# Patient Record
Sex: FEMALE | Race: WHITE | NOT HISPANIC OR LATINO | Employment: PART TIME | URBAN - METROPOLITAN AREA
[De-identification: names, ages, dates, MRNs, and addresses within clinical notes are randomized per-mention and may not be internally consistent; named-entity substitution may affect disease eponyms.]

---

## 2017-05-03 ENCOUNTER — TELEPHONE (OUTPATIENT)
Dept: INTERNAL MEDICINE | Facility: CLINIC | Age: 67
End: 2017-05-03

## 2017-05-05 ENCOUNTER — TRANSCRIBE ORDERS (OUTPATIENT)
Dept: INTERNAL MEDICINE | Facility: CLINIC | Age: 67
End: 2017-05-05

## 2017-05-05 DIAGNOSIS — Z12.31 VISIT FOR SCREENING MAMMOGRAM: Primary | ICD-10-CM

## 2017-05-09 ENCOUNTER — OFFICE VISIT (OUTPATIENT)
Dept: INTERNAL MEDICINE | Facility: CLINIC | Age: 67
End: 2017-05-09

## 2017-05-09 VITALS
DIASTOLIC BLOOD PRESSURE: 76 MMHG | HEART RATE: 67 BPM | BODY MASS INDEX: 28.55 KG/M2 | HEIGHT: 61 IN | OXYGEN SATURATION: 98 % | SYSTOLIC BLOOD PRESSURE: 124 MMHG | WEIGHT: 151.2 LBS

## 2017-05-09 DIAGNOSIS — Z00.00 ANNUAL PHYSICAL EXAM: Primary | ICD-10-CM

## 2017-05-09 LAB
BILIRUB BLD-MCNC: NEGATIVE MG/DL
CLARITY, POC: CLEAR
COLOR UR: ABNORMAL
GLUCOSE UR STRIP-MCNC: NEGATIVE MG/DL
KETONES UR QL: NEGATIVE
LEUKOCYTE EST, POC: NEGATIVE
NITRITE UR-MCNC: NEGATIVE MG/ML
PH UR: 7 [PH] (ref 5–8)
PROT UR STRIP-MCNC: NEGATIVE MG/DL
RBC # UR STRIP: NEGATIVE /UL
SP GR UR: 1 (ref 1–1.03)
UROBILINOGEN UR QL: NORMAL

## 2017-05-09 PROCEDURE — 99397 PER PM REEVAL EST PAT 65+ YR: CPT | Performed by: INTERNAL MEDICINE

## 2017-05-09 PROCEDURE — 90471 IMMUNIZATION ADMIN: CPT | Performed by: INTERNAL MEDICINE

## 2017-05-09 PROCEDURE — 81003 URINALYSIS AUTO W/O SCOPE: CPT | Performed by: INTERNAL MEDICINE

## 2017-05-09 PROCEDURE — 90670 PCV13 VACCINE IM: CPT | Performed by: INTERNAL MEDICINE

## 2017-05-09 PROCEDURE — 90715 TDAP VACCINE 7 YRS/> IM: CPT | Performed by: INTERNAL MEDICINE

## 2017-05-09 PROCEDURE — 90472 IMMUNIZATION ADMIN EACH ADD: CPT | Performed by: INTERNAL MEDICINE

## 2017-05-09 RX ORDER — NAPROXEN SODIUM 220 MG
1 TABLET ORAL 2 TIMES DAILY
COMMUNITY

## 2017-05-12 ENCOUNTER — APPOINTMENT (OUTPATIENT)
Dept: MAMMOGRAPHY | Facility: HOSPITAL | Age: 67
End: 2017-05-12
Attending: INTERNAL MEDICINE

## 2017-05-18 ENCOUNTER — HOSPITAL ENCOUNTER (OUTPATIENT)
Dept: MAMMOGRAPHY | Facility: HOSPITAL | Age: 67
Discharge: HOME | End: 2017-05-18
Attending: INTERNAL MEDICINE | Admitting: INTERNAL MEDICINE

## 2017-05-18 DIAGNOSIS — Z12.31 VISIT FOR SCREENING MAMMOGRAM: ICD-10-CM

## 2017-05-18 PROCEDURE — G0202 SCR MAMMO BI INCL CAD: HCPCS | Performed by: RADIOLOGY

## 2017-05-18 PROCEDURE — 77063 BREAST TOMOSYNTHESIS BI: CPT

## 2017-05-18 PROCEDURE — 77063 BREAST TOMOSYNTHESIS BI: CPT | Performed by: RADIOLOGY

## 2017-05-18 PROCEDURE — G0202 SCR MAMMO BI INCL CAD: HCPCS

## 2018-04-24 ENCOUNTER — TRANSCRIBE ORDERS (OUTPATIENT)
Dept: INTERNAL MEDICINE | Facility: CLINIC | Age: 68
End: 2018-04-24

## 2018-04-24 DIAGNOSIS — Z12.31 VISIT FOR SCREENING MAMMOGRAM: Primary | ICD-10-CM

## 2018-05-25 ENCOUNTER — HOSPITAL ENCOUNTER (OUTPATIENT)
Dept: MAMMOGRAPHY | Facility: HOSPITAL | Age: 68
Discharge: HOME | End: 2018-05-25
Attending: INTERNAL MEDICINE | Admitting: INTERNAL MEDICINE

## 2018-05-25 DIAGNOSIS — Z12.31 VISIT FOR SCREENING MAMMOGRAM: ICD-10-CM

## 2018-05-25 PROCEDURE — 77067 SCR MAMMO BI INCL CAD: CPT

## 2018-05-25 PROCEDURE — 77063 BREAST TOMOSYNTHESIS BI: CPT

## 2018-05-25 PROCEDURE — 77067 SCR MAMMO BI INCL CAD: CPT | Performed by: RADIOLOGY

## 2018-05-25 PROCEDURE — 77063 BREAST TOMOSYNTHESIS BI: CPT | Performed by: RADIOLOGY

## 2018-10-26 ENCOUNTER — OFFICE VISIT (OUTPATIENT)
Dept: INTERNAL MEDICINE | Facility: CLINIC | Age: 68
End: 2018-10-26

## 2018-10-26 VITALS
OXYGEN SATURATION: 95 % | BODY MASS INDEX: 29.04 KG/M2 | HEIGHT: 61 IN | HEART RATE: 64 BPM | WEIGHT: 153.8 LBS | SYSTOLIC BLOOD PRESSURE: 130 MMHG | DIASTOLIC BLOOD PRESSURE: 82 MMHG

## 2018-10-26 DIAGNOSIS — Z11.59 SCREENING FOR VIRAL DISEASE: ICD-10-CM

## 2018-10-26 DIAGNOSIS — E78.5 HYPERLIPIDEMIA, UNSPECIFIED HYPERLIPIDEMIA TYPE: ICD-10-CM

## 2018-10-26 DIAGNOSIS — Z00.00 MEDICARE ANNUAL WELLNESS VISIT, SUBSEQUENT: Primary | ICD-10-CM

## 2018-10-26 DIAGNOSIS — E55.9 VITAMIN D DEFICIENCY: ICD-10-CM

## 2018-10-26 DIAGNOSIS — Z23 ENCOUNTER FOR IMMUNIZATION: ICD-10-CM

## 2018-10-26 DIAGNOSIS — L60.0 INGROWN NAIL OF GREAT TOE OF LEFT FOOT: ICD-10-CM

## 2018-10-26 LAB
25(OH)D3 SERPL-MCNC: 37.2 NG/ML
ALBUMIN SERPL-MCNC: 4.42 G/DL (ref 3.2–4.8)
ALBUMIN/GLOB SERPL: 2.4 G/DL (ref 1.5–2.5)
ALP SERPL-CCNC: 59 U/L (ref 25–100)
ALT SERPL W P-5'-P-CCNC: 18 U/L (ref 7–40)
ANION GAP SERPL CALCULATED.3IONS-SCNC: 6 MMOL/L (ref 3–11)
ARTICHOKE IGE QN: 93 MG/DL (ref 0–130)
AST SERPL-CCNC: 23 U/L (ref 0–33)
BILIRUB SERPL-MCNC: 0.3 MG/DL (ref 0.3–1.2)
BUN BLD-MCNC: 12 MG/DL (ref 9–23)
BUN/CREAT SERPL: 15.4 (ref 7–25)
CALCIUM SPEC-SCNC: 9.3 MG/DL (ref 8.7–10.4)
CHLORIDE SERPL-SCNC: 104 MMOL/L (ref 99–109)
CHOLEST SERPL-MCNC: 162 MG/DL (ref 0–200)
CO2 SERPL-SCNC: 29 MMOL/L (ref 20–31)
CREAT BLD-MCNC: 0.78 MG/DL (ref 0.6–1.3)
DEPRECATED RDW RBC AUTO: 43.4 FL (ref 37–54)
ERYTHROCYTE [DISTWIDTH] IN BLOOD BY AUTOMATED COUNT: 13.1 % (ref 11.3–14.5)
GFR SERPL CREATININE-BSD FRML MDRD: 74 ML/MIN/1.73
GLOBULIN UR ELPH-MCNC: 1.9 GM/DL
GLUCOSE BLD-MCNC: 91 MG/DL (ref 70–100)
HCT VFR BLD AUTO: 41.3 % (ref 34.5–44)
HCV AB SER DONR QL: NORMAL
HDLC SERPL-MCNC: 61 MG/DL (ref 40–60)
HGB BLD-MCNC: 13.4 G/DL (ref 11.5–15.5)
MCH RBC QN AUTO: 29.5 PG (ref 27–31)
MCHC RBC AUTO-ENTMCNC: 32.4 G/DL (ref 32–36)
MCV RBC AUTO: 91 FL (ref 80–99)
PLATELET # BLD AUTO: 202 10*3/MM3 (ref 150–450)
PMV BLD AUTO: 12.4 FL (ref 6–12)
POTASSIUM BLD-SCNC: 4.1 MMOL/L (ref 3.5–5.5)
PROT SERPL-MCNC: 6.3 G/DL (ref 5.7–8.2)
RBC # BLD AUTO: 4.54 10*6/MM3 (ref 3.89–5.14)
SODIUM BLD-SCNC: 139 MMOL/L (ref 132–146)
TRIGL SERPL-MCNC: 88 MG/DL (ref 0–150)
TSH SERPL DL<=0.05 MIU/L-ACNC: 2.58 MIU/ML (ref 0.35–5.35)
WBC NRBC COR # BLD: 6.79 10*3/MM3 (ref 3.5–10.8)

## 2018-10-26 PROCEDURE — 80061 LIPID PANEL: CPT | Performed by: INTERNAL MEDICINE

## 2018-10-26 PROCEDURE — 82306 VITAMIN D 25 HYDROXY: CPT | Performed by: INTERNAL MEDICINE

## 2018-10-26 PROCEDURE — G0439 PPPS, SUBSEQ VISIT: HCPCS | Performed by: INTERNAL MEDICINE

## 2018-10-26 PROCEDURE — 86803 HEPATITIS C AB TEST: CPT | Performed by: INTERNAL MEDICINE

## 2018-10-26 PROCEDURE — G0009 ADMIN PNEUMOCOCCAL VACCINE: HCPCS | Performed by: INTERNAL MEDICINE

## 2018-10-26 PROCEDURE — 84443 ASSAY THYROID STIM HORMONE: CPT | Performed by: INTERNAL MEDICINE

## 2018-10-26 PROCEDURE — 85027 COMPLETE CBC AUTOMATED: CPT | Performed by: INTERNAL MEDICINE

## 2018-10-26 PROCEDURE — 80053 COMPREHEN METABOLIC PANEL: CPT | Performed by: INTERNAL MEDICINE

## 2018-10-26 PROCEDURE — 90732 PPSV23 VACC 2 YRS+ SUBQ/IM: CPT | Performed by: INTERNAL MEDICINE

## 2018-10-26 NOTE — PROGRESS NOTES
QUICK REFERENCE INFORMATION:  The ABCs of the Annual Wellness Visit    Subsequent Medicare Wellness Visit    HEALTH RISK ASSESSMENT    1950    Recent Hospitalizations:  No hospitalization(s) within the last year..        Current Medical Providers:  Patient Care Team:  Rachele Paulino MD as PCP - General  Rachele Paulino MD as PCP - Family Medicine        Smoking Status:  History   Smoking Status   • Never Smoker   Smokeless Tobacco   • Never Used       Alcohol Consumption:  History   Alcohol Use   • Yes     Comment: 5-6 glasses of winea year       Depression Screen:   PHQ-2/PHQ-9 Depression Screening 10/26/2018   Little interest or pleasure in doing things 0   Feeling down, depressed, or hopeless 0   Total Score 0       Health Habits and Functional and Cognitive Screening:  Functional & Cognitive Status 10/26/2018   Do you have difficulty preparing food and eating? No   Do you have difficulty bathing yourself, getting dressed or grooming yourself? No   Do you have difficulty using the toilet? No   Do you have difficulty moving around from place to place? No   Do you have trouble with steps or getting out of a bed or a chair? No   In the past year have you fallen or experienced a near fall? Yes   Current Diet Well Balanced Diet   Dental Exam Up to date   Eye Exam Up to date   Exercise (times per week) 3 times per week   Current Exercise Activities Include Walking   Do you need help using the phone?  No   Are you deaf or do you have serious difficulty hearing?  No   Do you need help with transportation? No   Do you need help shopping? No   Do you need help preparing meals?  No   Do you need help with housework?  No   Do you need help with laundry? No   Do you need help taking your medications? No   Do you need help managing money? No   Do you ever drive or ride in a car without wearing a seat belt? No   Have you felt unusual stress, anger or loneliness in the last month? No   Who do you live with? Spouse   If  you need help, do you have trouble finding someone available to you? No   Have you been bothered in the last four weeks by sexual problems? No   Do you have difficulty concentrating, remembering or making decisions? No           Does the patient have evidence of cognitive impairment? No    Aspirin use counseling: Does not need ASA (and currently is not on it)      Recent Lab Results:  CMP:  Lab Results   Component Value Date    BUN 12 10/26/2018    CREATININE 0.78 10/26/2018    EGFRIFNONA 74 10/26/2018    BCR 15.4 10/26/2018     10/26/2018    K 4.1 10/26/2018    CO2 29.0 10/26/2018    CALCIUM 9.3 10/26/2018    ALBUMIN 4.42 10/26/2018    BILITOT 0.3 10/26/2018    ALKPHOS 59 10/26/2018    AST 23 10/26/2018    ALT 18 10/26/2018     Lipid Panel:  Lab Results   Component Value Date    CHOL 162 10/26/2018    TRIG 88 10/26/2018    HDL 61 (H) 10/26/2018     HbA1c:       Visual Acuity:  No exam data present    Age-appropriate Screening Schedule:  Refer to the list below for future screening recommendations based on patient's age, sex and/or medical conditions. Orders for these recommended tests are listed in the plan section. The patient has been provided with a written plan.    Health Maintenance   Topic Date Due   • ZOSTER VACCINE (2 of 2) 07/14/2017   • INFLUENZA VACCINE  11/07/2018 (Originally 8/1/2018)   • DXA SCAN  05/11/2019   • COLONOSCOPY  08/01/2019   • LIPID PANEL  10/26/2019   • MAMMOGRAM  05/25/2020   • TDAP/TD VACCINES (2 - Td) 05/09/2027   • PNEUMOCOCCAL VACCINES (65+ LOW/MEDIUM RISK)  Completed        Subjective   History of Present Illness    Kathleen Goetz is a 68 y.o. female who presents for an Subsequent Wellness Visit.    The following portions of the patient's history were reviewed and updated as appropriate: allergies, current medications, past family history, past medical history, past social history, past surgical history and problem list.    Outpatient Medications Prior to Visit   Medication  "Sig Dispense Refill   • Calcium Carbonate (CALTRATE 600) 1500 (600 CA) MG tablet Take 1 tablet by mouth daily.     • Misc Natural Products (OSTEO BI-FLEX TRIPLE STRENGTH) tablet Take 1 tablet by mouth daily.     • Multiple Vitamin (DAILY VITAMIN) tablet Take 1 tablet by mouth daily.     • naproxen sodium (ALEVE) 220 MG tablet Take 2 tablets by mouth 2 (Two) Times a Day.     • Turmeric 450 MG capsule Take 1 capsule by mouth Daily.       No facility-administered medications prior to visit.        Patient Active Problem List   Diagnosis   • Arthritis   • Thumb pain   • Annual physical exam       Advance Care Planning:  has an advance directive - a copy HAS NOT been provided. Have asked the patient to send this to us to add to record.    Identification of Risk Factors:  Risk factors include: weight  and unhealthy diet.    Review of Systems    Compared to one year ago, the patient feels her physical health is better.  Compared to one year ago, the patient feels her mental health is better.    Objective     Physical Exam    Vitals:    10/26/18 1108   BP: 130/82   Pulse: 64   SpO2: 95%  Comment: ra   Weight: 69.8 kg (153 lb 12.8 oz)   Height: 154.9 cm (61\")       Patient's Body mass index is 29.06 kg/m². BMI is above normal parameters. Recommendations include: exercise counseling and nutrition counseling.      Assessment/Plan   Patient Self-Management and Personalized Health Advice  The patient has been provided with information about: exercise, prevention of cardiac or vascular disease, fall prevention and supplements and preventive services including:   · Bone densitometry screening, Counseling for cardiovascular disease risk reduction, Diabetes screening, see lab orders, Pneumococcal vaccine , Zostavax vaccine (Herpes Zoster).    Visit Diagnoses:    ICD-10-CM ICD-9-CM   1. Medicare annual wellness visit, subsequent Z00.00 V70.0   2. Hyperlipidemia, unspecified hyperlipidemia type E78.5 272.4   3. Screening for viral " disease Z11.59 V73.99   4. Vitamin D deficiency E55.9 268.9   5. Ingrown nail of great toe of left foot L60.0 703.0   6. Encounter for immunization  Z23 V03.89       Orders Placed This Encounter   Procedures   • Pneumococcal Polysaccharide Vaccine 23-Valent (PPSV23) Greater Than or Equal To 1yo Subcutaneous / IM   • CBC (No Diff)   • Comprehensive Metabolic Panel   • Lipid Panel   • TSH   • Vitamin D 25 Hydroxy   • Hepatitis C Antibody       Outpatient Encounter Prescriptions as of 10/26/2018   Medication Sig Dispense Refill   • Calcium Carbonate (CALTRATE 600) 1500 (600 CA) MG tablet Take 1 tablet by mouth daily.     • Misc Natural Products (OSTEO BI-FLEX TRIPLE STRENGTH) tablet Take 1 tablet by mouth daily.     • Multiple Vitamin (DAILY VITAMIN) tablet Take 1 tablet by mouth daily.     • naproxen sodium (ALEVE) 220 MG tablet Take 2 tablets by mouth 2 (Two) Times a Day.     • Turmeric 450 MG capsule Take 1 capsule by mouth Daily.       No facility-administered encounter medications on file as of 10/26/2018.        Reviewed use of high risk medication in the elderly: yes  Reviewed for potential of harmful drug interactions in the elderly: yes    Medicare wellness    Subjective   Kathleen Goetz is a 68 y.o. female is here today for follow-up.    HPI  Here for a f/u, recovering from a cold, denies cough, fever.    Current Outpatient Prescriptions:   •  Calcium Carbonate (CALTRATE 600) 1500 (600 CA) MG tablet, Take 1 tablet by mouth daily., Disp: , Rfl:   •  Misc Natural Products (OSTEO BI-FLEX TRIPLE STRENGTH) tablet, Take 1 tablet by mouth daily., Disp: , Rfl:   •  Multiple Vitamin (DAILY VITAMIN) tablet, Take 1 tablet by mouth daily., Disp: , Rfl:   •  naproxen sodium (ALEVE) 220 MG tablet, Take 2 tablets by mouth 2 (Two) Times a Day., Disp: , Rfl:   •  Turmeric 450 MG capsule, Take 1 capsule by mouth Daily., Disp: , Rfl:       The following portions of the patient's history were reviewed and updated as  "appropriate: allergies, current medications, past family history, past medical history, past social history, past surgical history and problem list.        Objective   /82   Pulse 64   Ht 154.9 cm (61\")   Wt 69.8 kg (153 lb 12.8 oz)   SpO2 95% Comment: ra  BMI 29.06 kg/m²         Results for orders placed or performed in visit on 10/26/18   CBC (No Diff)   Result Value Ref Range    WBC 6.79 3.50 - 10.80 10*3/mm3    RBC 4.54 3.89 - 5.14 10*6/mm3    Hemoglobin 13.4 11.5 - 15.5 g/dL    Hematocrit 41.3 34.5 - 44.0 %    MCV 91.0 80.0 - 99.0 fL    MCH 29.5 27.0 - 31.0 pg    MCHC 32.4 32.0 - 36.0 g/dL    RDW 13.1 11.3 - 14.5 %    RDW-SD 43.4 37.0 - 54.0 fl    MPV 12.4 (H) 6.0 - 12.0 fL    Platelets 202 150 - 450 10*3/mm3   Comprehensive Metabolic Panel   Result Value Ref Range    Glucose 91 70 - 100 mg/dL    BUN 12 9 - 23 mg/dL    Creatinine 0.78 0.60 - 1.30 mg/dL    Sodium 139 132 - 146 mmol/L    Potassium 4.1 3.5 - 5.5 mmol/L    Chloride 104 99 - 109 mmol/L    CO2 29.0 20.0 - 31.0 mmol/L    Calcium 9.3 8.7 - 10.4 mg/dL    Total Protein 6.3 5.7 - 8.2 g/dL    Albumin 4.42 3.20 - 4.80 g/dL    ALT (SGPT) 18 7 - 40 U/L    AST (SGOT) 23 0 - 33 U/L    Alkaline Phosphatase 59 25 - 100 U/L    Total Bilirubin 0.3 0.3 - 1.2 mg/dL    eGFR Non African Amer 74 >60 mL/min/1.73    Globulin 1.9 gm/dL    A/G Ratio 2.4 1.5 - 2.5 g/dL    BUN/Creatinine Ratio 15.4 7.0 - 25.0    Anion Gap 6.0 3.0 - 11.0 mmol/L   Lipid Panel   Result Value Ref Range    Total Cholesterol 162 0 - 200 mg/dL    Triglycerides 88 0 - 150 mg/dL    HDL Cholesterol 61 (H) 40 - 60 mg/dL    LDL Cholesterol  93 0 - 130 mg/dL   TSH   Result Value Ref Range    TSH 2.575 0.350 - 5.350 mIU/mL   Vitamin D 25 Hydroxy   Result Value Ref Range    25 Hydroxy, Vitamin D 37.2 ng/ml   Hepatitis C Antibody   Result Value Ref Range    Hepatitis C Ab Non-Reactive Non-Reactive             Assessment/Plan   Diagnoses and all orders for this visit:    Medicare annual wellness " visit, subsequent  -     Pneumococcal Polysaccharide Vaccine 23-Valent (PPSV23) Greater Than or Equal To 1yo Subcutaneous / IM    Hyperlipidemia, unspecified hyperlipidemia type  -     Comprehensive Metabolic Panel  -     Lipid Panel  -     TSH    Screening for viral disease  -     Hepatitis C Antibody    Vitamin D deficiency  -     Vitamin D 25 Hydroxy    Ingrown nail of great toe of left foot  -     CBC (No Diff)    Encounter for immunization   -     Pneumococcal Polysaccharide Vaccine 23-Valent (PPSV23) Greater Than or Equal To 1yo Subcutaneous / IM                 Return in about 1 year (around 10/26/2019) for Medicare Wellness.  Follow Up:  Return in about 1 year (around 10/26/2019) for Medicare Wellness.     An After Visit Summary and PPPS with all of these plans were given to the patient.

## 2019-04-10 ENCOUNTER — TRANSCRIBE ORDERS (OUTPATIENT)
Dept: ADMINISTRATIVE | Facility: HOSPITAL | Age: 69
End: 2019-04-10

## 2019-04-10 DIAGNOSIS — Z12.31 VISIT FOR SCREENING MAMMOGRAM: Primary | ICD-10-CM

## 2019-06-04 ENCOUNTER — APPOINTMENT (OUTPATIENT)
Dept: MAMMOGRAPHY | Facility: HOSPITAL | Age: 69
End: 2019-06-04

## 2019-06-18 ENCOUNTER — OFFICE VISIT (OUTPATIENT)
Dept: INTERNAL MEDICINE | Facility: CLINIC | Age: 69
End: 2019-06-18

## 2019-06-18 VITALS
DIASTOLIC BLOOD PRESSURE: 80 MMHG | BODY MASS INDEX: 30.02 KG/M2 | OXYGEN SATURATION: 97 % | WEIGHT: 159 LBS | SYSTOLIC BLOOD PRESSURE: 130 MMHG | HEART RATE: 70 BPM | HEIGHT: 61 IN

## 2019-06-18 DIAGNOSIS — Z78.0 POSTMENOPAUSAL: ICD-10-CM

## 2019-06-18 DIAGNOSIS — Z01.419 ENCOUNTER FOR ROUTINE GYNECOLOGIC EXAMINATION IN MEDICARE PATIENT: Primary | ICD-10-CM

## 2019-06-18 PROCEDURE — G0101 CA SCREEN;PELVIC/BREAST EXAM: HCPCS | Performed by: INTERNAL MEDICINE

## 2019-06-21 NOTE — PROGRESS NOTES
"Gynecologic Exam    Subjective   Kathleen Goetz is a 69 y.o. female is here today for follow-up.    History of Present Illness   Here for medicare gyn exam, and needs pap.    Current Outpatient Medications:   •  Calcium Carbonate (CALTRATE 600) 1500 (600 CA) MG tablet, Take 1 tablet by mouth daily., Disp: , Rfl:   •  Misc Natural Products (OSTEO BI-FLEX TRIPLE STRENGTH) tablet, Take 1 tablet by mouth daily., Disp: , Rfl:   •  Multiple Vitamin (DAILY VITAMIN) tablet, Take 1 tablet by mouth daily., Disp: , Rfl:   •  naproxen sodium (ALEVE) 220 MG tablet, Take 2 tablets by mouth 2 (Two) Times a Day., Disp: , Rfl:   •  Turmeric 450 MG capsule, Take 1 capsule by mouth Daily., Disp: , Rfl:       The following portions of the patient's history were reviewed and updated as appropriate: allergies, current medications, past family history, past medical history, past social history, past surgical history and problem list.    Review of Systems   Constitutional: Negative.  Negative for chills and fever.   HENT: Negative for ear discharge, ear pain, sinus pressure and sore throat.    Respiratory: Negative for cough, chest tightness and shortness of breath.    Cardiovascular: Negative for chest pain, palpitations and leg swelling.   Gastrointestinal: Negative for diarrhea, nausea and vomiting.   Genitourinary: Negative for dysuria.   Musculoskeletal: Negative for arthralgias, back pain and myalgias.   Neurological: Negative for dizziness, syncope and headaches.   Psychiatric/Behavioral: Negative for confusion and sleep disturbance.       Objective   /80   Pulse 70   Ht 154.9 cm (61\")   Wt 72.1 kg (159 lb)   SpO2 97% Comment: ra  BMI 30.04 kg/m²   Physical Exam   Constitutional: She is oriented to person, place, and time. She appears well-developed and well-nourished.   HENT:   Head: Normocephalic and atraumatic.   Mouth/Throat: No oropharyngeal exudate.   Cardiovascular: Normal rate and regular rhythm. "   Pulmonary/Chest: Effort normal and breath sounds normal. Right breast exhibits no mass and no tenderness. Left breast exhibits no mass and no tenderness. Breasts are symmetrical.   Abdominal: Soft. Bowel sounds are normal. She exhibits no distension. There is no tenderness.   Genitourinary: Vagina normal and uterus normal. Rectal exam shows guaiac negative stool. No breast discharge. No vaginal discharge found.   Musculoskeletal: She exhibits no edema.   Neurological: She is alert and oriented to person, place, and time. No cranial nerve deficit.   Skin: Skin is warm and dry.   Psychiatric: She has a normal mood and affect. Judgment normal.   Nursing note and vitals reviewed.        Results for orders placed or performed in visit on 10/26/18   CBC (No Diff)   Result Value Ref Range    WBC 6.79 3.50 - 10.80 10*3/mm3    RBC 4.54 3.89 - 5.14 10*6/mm3    Hemoglobin 13.4 11.5 - 15.5 g/dL    Hematocrit 41.3 34.5 - 44.0 %    MCV 91.0 80.0 - 99.0 fL    MCH 29.5 27.0 - 31.0 pg    MCHC 32.4 32.0 - 36.0 g/dL    RDW 13.1 11.3 - 14.5 %    RDW-SD 43.4 37.0 - 54.0 fl    MPV 12.4 (H) 6.0 - 12.0 fL    Platelets 202 150 - 450 10*3/mm3   Comprehensive Metabolic Panel   Result Value Ref Range    Glucose 91 70 - 100 mg/dL    BUN 12 9 - 23 mg/dL    Creatinine 0.78 0.60 - 1.30 mg/dL    Sodium 139 132 - 146 mmol/L    Potassium 4.1 3.5 - 5.5 mmol/L    Chloride 104 99 - 109 mmol/L    CO2 29.0 20.0 - 31.0 mmol/L    Calcium 9.3 8.7 - 10.4 mg/dL    Total Protein 6.3 5.7 - 8.2 g/dL    Albumin 4.42 3.20 - 4.80 g/dL    ALT (SGPT) 18 7 - 40 U/L    AST (SGOT) 23 0 - 33 U/L    Alkaline Phosphatase 59 25 - 100 U/L    Total Bilirubin 0.3 0.3 - 1.2 mg/dL    eGFR Non African Amer 74 >60 mL/min/1.73    Globulin 1.9 gm/dL    A/G Ratio 2.4 1.5 - 2.5 g/dL    BUN/Creatinine Ratio 15.4 7.0 - 25.0    Anion Gap 6.0 3.0 - 11.0 mmol/L   Lipid Panel   Result Value Ref Range    Total Cholesterol 162 0 - 200 mg/dL    Triglycerides 88 0 - 150 mg/dL    HDL  Cholesterol 61 (H) 40 - 60 mg/dL    LDL Cholesterol  93 0 - 130 mg/dL   TSH   Result Value Ref Range    TSH 2.575 0.350 - 5.350 mIU/mL   Vitamin D 25 Hydroxy   Result Value Ref Range    25 Hydroxy, Vitamin D 37.2 ng/ml   Hepatitis C Antibody   Result Value Ref Range    Hepatitis C Ab Non-Reactive Non-Reactive             Assessment/Plan   Diagnoses and all orders for this visit:    Encounter for routine gynecologic examination in Medicare patient  Comments:  pap sent    Postmenopausal  -     DEXA Bone Density Axial             Return for Next scheduled follow up.

## 2019-07-01 ENCOUNTER — DOCUMENTATION (OUTPATIENT)
Dept: INTERNAL MEDICINE | Facility: CLINIC | Age: 69
End: 2019-07-01

## 2019-07-25 ENCOUNTER — HOSPITAL ENCOUNTER (OUTPATIENT)
Dept: MAMMOGRAPHY | Facility: HOSPITAL | Age: 69
Discharge: HOME | End: 2019-07-25
Admitting: INTERNAL MEDICINE

## 2019-07-25 DIAGNOSIS — Z12.31 VISIT FOR SCREENING MAMMOGRAM: ICD-10-CM

## 2019-07-25 PROCEDURE — 77063 BREAST TOMOSYNTHESIS BI: CPT | Performed by: RADIOLOGY

## 2019-07-25 PROCEDURE — 77067 SCR MAMMO BI INCL CAD: CPT

## 2019-07-25 PROCEDURE — 77067 SCR MAMMO BI INCL CAD: CPT | Performed by: RADIOLOGY

## 2019-07-25 PROCEDURE — 77063 BREAST TOMOSYNTHESIS BI: CPT

## 2019-08-07 ENCOUNTER — HOSPITAL ENCOUNTER (OUTPATIENT)
Dept: BONE DENSITY | Facility: HOSPITAL | Age: 69
Discharge: HOME | End: 2019-08-07
Admitting: INTERNAL MEDICINE

## 2019-08-07 PROCEDURE — 77080 DXA BONE DENSITY AXIAL: CPT

## 2019-11-01 ENCOUNTER — OFFICE VISIT (OUTPATIENT)
Dept: INTERNAL MEDICINE | Facility: CLINIC | Age: 69
End: 2019-11-01

## 2019-11-01 VITALS
HEIGHT: 61 IN | SYSTOLIC BLOOD PRESSURE: 138 MMHG | OXYGEN SATURATION: 97 % | HEART RATE: 62 BPM | BODY MASS INDEX: 28.62 KG/M2 | WEIGHT: 151.6 LBS | DIASTOLIC BLOOD PRESSURE: 80 MMHG

## 2019-11-01 DIAGNOSIS — Z00.00 MEDICARE ANNUAL WELLNESS VISIT, SUBSEQUENT: Primary | ICD-10-CM

## 2019-11-01 DIAGNOSIS — Z86.2 HISTORY OF ANEMIA: ICD-10-CM

## 2019-11-01 DIAGNOSIS — E78.5 DYSLIPIDEMIA: ICD-10-CM

## 2019-11-01 DIAGNOSIS — E55.9 VITAMIN D INSUFFICIENCY: ICD-10-CM

## 2019-11-01 DIAGNOSIS — F43.9 SITUATIONAL STRESS: ICD-10-CM

## 2019-11-01 DIAGNOSIS — Z12.11 SCREENING FOR COLON CANCER: ICD-10-CM

## 2019-11-01 LAB
25(OH)D3 SERPL-MCNC: 62 NG/ML (ref 30–100)
ALBUMIN SERPL-MCNC: 4.7 G/DL (ref 3.5–5.2)
ALBUMIN/GLOB SERPL: 1.8 G/DL
ALP SERPL-CCNC: 59 U/L (ref 39–117)
ALT SERPL W P-5'-P-CCNC: 13 U/L (ref 1–33)
ANION GAP SERPL CALCULATED.3IONS-SCNC: 12.1 MMOL/L (ref 5–15)
AST SERPL-CCNC: 19 U/L (ref 1–32)
BILIRUB SERPL-MCNC: 0.3 MG/DL (ref 0.2–1.2)
BUN BLD-MCNC: 15 MG/DL (ref 8–23)
BUN/CREAT SERPL: 16.3 (ref 7–25)
CALCIUM SPEC-SCNC: 9.9 MG/DL (ref 8.6–10.5)
CHLORIDE SERPL-SCNC: 103 MMOL/L (ref 98–107)
CHOLEST SERPL-MCNC: 186 MG/DL (ref 0–200)
CO2 SERPL-SCNC: 27.9 MMOL/L (ref 22–29)
CREAT BLD-MCNC: 0.92 MG/DL (ref 0.57–1)
DEPRECATED RDW RBC AUTO: 39.6 FL (ref 37–54)
ERYTHROCYTE [DISTWIDTH] IN BLOOD BY AUTOMATED COUNT: 12.5 % (ref 12.3–15.4)
GFR SERPL CREATININE-BSD FRML MDRD: 61 ML/MIN/1.73
GLOBULIN UR ELPH-MCNC: 2.6 GM/DL
GLUCOSE BLD-MCNC: 86 MG/DL (ref 65–99)
HCT VFR BLD AUTO: 41.1 % (ref 34–46.6)
HDLC SERPL-MCNC: 65 MG/DL (ref 40–60)
HGB BLD-MCNC: 14.1 G/DL (ref 12–15.9)
LDLC SERPL CALC-MCNC: 100 MG/DL (ref 0–100)
LDLC/HDLC SERPL: 1.54 {RATIO}
MCH RBC QN AUTO: 30.2 PG (ref 26.6–33)
MCHC RBC AUTO-ENTMCNC: 34.3 G/DL (ref 31.5–35.7)
MCV RBC AUTO: 88 FL (ref 79–97)
PLATELET # BLD AUTO: 187 10*3/MM3 (ref 140–450)
PMV BLD AUTO: 12.4 FL (ref 6–12)
POTASSIUM BLD-SCNC: 4.8 MMOL/L (ref 3.5–5.2)
PROT SERPL-MCNC: 7.3 G/DL (ref 6–8.5)
RBC # BLD AUTO: 4.67 10*6/MM3 (ref 3.77–5.28)
SODIUM BLD-SCNC: 143 MMOL/L (ref 136–145)
TRIGL SERPL-MCNC: 104 MG/DL (ref 0–150)
TSH SERPL DL<=0.05 MIU/L-ACNC: 5 UIU/ML (ref 0.27–4.2)
VLDLC SERPL-MCNC: 20.8 MG/DL
WBC NRBC COR # BLD: 5.48 10*3/MM3 (ref 3.4–10.8)

## 2019-11-01 PROCEDURE — G0439 PPPS, SUBSEQ VISIT: HCPCS | Performed by: INTERNAL MEDICINE

## 2019-11-01 PROCEDURE — 82306 VITAMIN D 25 HYDROXY: CPT | Performed by: INTERNAL MEDICINE

## 2019-11-01 PROCEDURE — G0444 DEPRESSION SCREEN ANNUAL: HCPCS | Performed by: INTERNAL MEDICINE

## 2019-11-01 PROCEDURE — 80053 COMPREHEN METABOLIC PANEL: CPT | Performed by: INTERNAL MEDICINE

## 2019-11-01 PROCEDURE — 80061 LIPID PANEL: CPT | Performed by: INTERNAL MEDICINE

## 2019-11-01 PROCEDURE — 85027 COMPLETE CBC AUTOMATED: CPT | Performed by: INTERNAL MEDICINE

## 2019-11-01 PROCEDURE — 84443 ASSAY THYROID STIM HORMONE: CPT | Performed by: INTERNAL MEDICINE

## 2019-11-01 PROCEDURE — 99213 OFFICE O/P EST LOW 20 MIN: CPT | Performed by: INTERNAL MEDICINE

## 2019-11-01 NOTE — PROGRESS NOTES
The ABCs of the Annual Wellness Visit  Subsequent Medicare Wellness Visit    Chief Complaint   Patient presents with   • Medicare Wellness-subsequent       Subjective   History of Present Illness:  Kathleen Goetz is a 69 y.o. female who presents for a Subsequent Medicare Wellness Visit.    HEALTH RISK ASSESSMENT    Recent Hospitalizations:  No hospitalization(s) within the last year.    Current Medical Providers:  Patient Care Team:  Rachele Paulino MD as PCP - General  Rachele Paulino MD as PCP - Family Medicine  Nicolás Reyes DPM as PCP - Claims Attributed    Smoking Status:  Social History     Tobacco Use   Smoking Status Never Smoker   Smokeless Tobacco Never Used   Tobacco Comment    My  smoked a pipe during the first 4 months of our marriage in 1977       Alcohol Consumption:  Social History     Substance and Sexual Activity   Alcohol Use Yes    Comment: A glass of wine on special occasions 6-9 times per year       Depression Screen:   PHQ-2/PHQ-9 Depression Screening 11/1/2019   Little interest or pleasure in doing things 1   Feeling down, depressed, or hopeless 1   Trouble falling or staying asleep, or sleeping too much 0   Feeling tired or having little energy 0   Poor appetite or overeating 1   Feeling bad about yourself - or that you are a failure or have let yourself or your family down 0   Trouble concentrating on things, such as reading the newspaper or watching television 0   Moving or speaking so slowly that other people could have noticed. Or the opposite - being so fidgety or restless that you have been moving around a lot more than usual 0   Thoughts that you would be better off dead, or of hurting yourself in some way 0   Total Score 3   If you checked off any problems, how difficult have these problems made it for you to do your work, take care of things at home, or get along with other people? Not difficult at all       Fall Risk Screen:  RALF Fall Risk Assessment was  completed, and patient is at HIGH risk for falls. Assessment completed on:11/1/2019    Health Habits and Functional and Cognitive Screening:  Functional & Cognitive Status 11/1/2019   Do you have difficulty preparing food and eating? No   Do you have difficulty bathing yourself, getting dressed or grooming yourself? No   Do you have difficulty using the toilet? No   Do you have difficulty moving around from place to place? No   Do you have trouble with steps or getting out of a bed or a chair? No   Current Diet Well Balanced Diet   Dental Exam Up to date   Eye Exam Not up to date   Exercise (times per week) -   Current Exercise Activities Include No Regular Exercise   Do you need help using the phone?  No   Are you deaf or do you have serious difficulty hearing?  No   Do you need help with transportation? No   Do you need help shopping? No   Do you need help preparing meals?  No   Do you need help with housework?  No   Do you need help with laundry? No   Do you need help taking your medications? No   Do you need help managing money? No   Do you ever drive or ride in a car without wearing a seat belt? No   Have you felt unusual stress, anger or loneliness in the last month? No   Who do you live with? Alone   If you need help, do you have trouble finding someone available to you? No   Have you been bothered in the last four weeks by sexual problems? No   Do you have difficulty concentrating, remembering or making decisions? No         Does the patient have evidence of cognitive impairment? No    Asprin use counseling:Does not need ASA (and currently is not on it)    Age-appropriate Screening Schedule:  Refer to the list below for future screening recommendations based on patient's age, sex and/or medical conditions. Orders for these recommended tests are listed in the plan section. The patient has been provided with a written plan.    Health Maintenance   Topic Date Due   • COLONOSCOPY  08/01/2019   • LIPID PANEL   11/01/2020   • MAMMOGRAM  07/25/2021   • DXA SCAN  08/07/2022   • TDAP/TD VACCINES (2 - Td) 05/09/2027   • INFLUENZA VACCINE  Completed   • PNEUMOCOCCAL VACCINES (65+ LOW/MEDIUM RISK)  Completed   • ZOSTER VACCINE  Completed          The following portions of the patient's history were reviewed and updated as appropriate: allergies, current medications, past family history, past medical history, past social history, past surgical history and problem list.    Outpatient Medications Prior to Visit   Medication Sig Dispense Refill   • Calcium Carbonate (CALTRATE 600) 1500 (600 CA) MG tablet Take 1 tablet by mouth daily.     • Misc Natural Products (OSTEO BI-FLEX TRIPLE STRENGTH) tablet Take 1 tablet by mouth daily.     • Multiple Vitamin (DAILY VITAMIN) tablet Take 1 tablet by mouth daily.     • naproxen sodium (ALEVE) 220 MG tablet Take 1 tablet by mouth 2 (Two) Times a Day.     • Turmeric 450 MG capsule Take 1 capsule by mouth Daily.       No facility-administered medications prior to visit.        Patient Active Problem List   Diagnosis   • Arthritis   • Thumb pain   • Annual physical exam       Advanced Care Planning:  Patient does not have an advance directive - not interested in additional information    Review of Systems   Constitutional: Negative for chills and fatigue.   HENT: Negative for congestion, ear pain and sore throat.    Eyes: Negative for pain, redness and visual disturbance.   Respiratory: Negative for cough and shortness of breath.    Cardiovascular: Negative for chest pain, palpitations and leg swelling.   Gastrointestinal: Negative for abdominal pain, diarrhea and nausea.   Endocrine: Negative for cold intolerance and heat intolerance.   Genitourinary: Negative for flank pain and urgency.   Musculoskeletal: Negative for arthralgias and gait problem.   Skin: Negative for pallor and rash.   Neurological: Negative for dizziness, weakness and headaches.   Psychiatric/Behavioral: Positive for  "dysphoric mood. Negative for sleep disturbance. The patient is not nervous/anxious.        Compared to one year ago, the patient feels her physical health is better.  Compared to one year ago, the patient feels her mental health is better.    Reviewed chart for potential of high risk medication in the elderly: yes  Reviewed chart for potential of harmful drug interactions in the elderly:yes    Objective         Vitals:    11/01/19 0851   BP: 138/80   Pulse: 62   SpO2: 97%  Comment: ra   Weight: 68.8 kg (151 lb 9.6 oz)   Height: 154.9 cm (61\")   PainSc: 0-No pain       Body mass index is 28.64 kg/m².  Discussed the patient's BMI with her. The BMI dressed for winter..    Physical Exam   Constitutional: She is oriented to person, place, and time. She appears well-developed and well-nourished.   HENT:   Head: Normocephalic and atraumatic.   Right Ear: External ear normal.   Left Ear: External ear normal.   Mouth/Throat: No oropharyngeal exudate.   Eyes: Conjunctivae are normal. Pupils are equal, round, and reactive to light.   Neck: Neck supple. No thyromegaly present.   Cardiovascular: Normal rate, regular rhythm and intact distal pulses. Exam reveals no friction rub.   No murmur heard.  Pulmonary/Chest: Effort normal and breath sounds normal. No stridor. She has no wheezes.   Abdominal: Soft. Bowel sounds are normal. She exhibits no distension and no mass. There is no tenderness. There is no rebound.   Musculoskeletal: She exhibits no edema or tenderness.   Lymphadenopathy:     She has no cervical adenopathy.   Neurological: She is alert and oriented to person, place, and time. She displays normal reflexes. No cranial nerve deficit. Coordination normal.   Skin: Skin is warm and dry.   Psychiatric: She has a normal mood and affect. Her behavior is normal. Judgment and thought content normal.   Nursing note and vitals reviewed.      Lab Results   Component Value Date    TRIG 104 11/01/2019    HDL 65 (H) 11/01/2019    " " 11/01/2019    VLDL 20.8 11/01/2019        Assessment/Plan   Medicare Risks and Personalized Health Plan  CMS Preventative Services Quick Reference  Cardiovascular risk  Colon Cancer Screening  Depression/Dysphoria  Fall Risk  Glaucoma Risk  Immunizations Discussed/Encouraged (specific immunizations; Influenza )  Obesity/Overweight   Osteoprorosis Risk    The above risks/problems have been discussed with the patient.  Pertinent information has been shared with the patient in the After Visit Summary.  Follow up plans and orders are seen below in the Assessment/Plan Section.    Medicare Wellness-subsequent    Subjective   Kathleen Goetz is a 69 y.o. female is here today for follow-up.  HPI  Stressful summer,  is s/ p BMT for Multiple myeloma. Has had a few days recently being deprressed.  is now at day 100, and she is having to shift gears, as not being needed so much any more.  Also October , had lost several family members- sister, mom and FANG.      Current Outpatient Medications:   •  Calcium Carbonate (CALTRATE 600) 1500 (600 CA) MG tablet, Take 1 tablet by mouth daily., Disp: , Rfl:   •  Misc Natural Products (OSTEO BI-FLEX TRIPLE STRENGTH) tablet, Take 1 tablet by mouth daily., Disp: , Rfl:   •  Multiple Vitamin (DAILY VITAMIN) tablet, Take 1 tablet by mouth daily., Disp: , Rfl:   •  naproxen sodium (ALEVE) 220 MG tablet, Take 1 tablet by mouth 2 (Two) Times a Day., Disp: , Rfl:   •  Turmeric 450 MG capsule, Take 1 capsule by mouth Daily., Disp: , Rfl:       The following portions of the patient's history were reviewed and updated as appropriate: allergies, current medications, past family history, past medical history, past social history, past surgical history and problem list.        Objective   /80   Pulse 62   Ht 154.9 cm (61\")   Wt 68.8 kg (151 lb 9.6 oz)   SpO2 97% Comment: ra  BMI 28.64 kg/m²         Results for orders placed or performed in visit on 11/01/19   CBC " (No Diff)   Result Value Ref Range    WBC 5.48 3.40 - 10.80 10*3/mm3    RBC 4.67 3.77 - 5.28 10*6/mm3    Hemoglobin 14.1 12.0 - 15.9 g/dL    Hematocrit 41.1 34.0 - 46.6 %    MCV 88.0 79.0 - 97.0 fL    MCH 30.2 26.6 - 33.0 pg    MCHC 34.3 31.5 - 35.7 g/dL    RDW 12.5 12.3 - 15.4 %    RDW-SD 39.6 37.0 - 54.0 fl    MPV 12.4 (H) 6.0 - 12.0 fL    Platelets 187 140 - 450 10*3/mm3   Comprehensive Metabolic Panel   Result Value Ref Range    Glucose 86 65 - 99 mg/dL    BUN 15 8 - 23 mg/dL    Creatinine 0.92 0.57 - 1.00 mg/dL    Sodium 143 136 - 145 mmol/L    Potassium 4.8 3.5 - 5.2 mmol/L    Chloride 103 98 - 107 mmol/L    CO2 27.9 22.0 - 29.0 mmol/L    Calcium 9.9 8.6 - 10.5 mg/dL    Total Protein 7.3 6.0 - 8.5 g/dL    Albumin 4.70 3.50 - 5.20 g/dL    ALT (SGPT) 13 1 - 33 U/L    AST (SGOT) 19 1 - 32 U/L    Alkaline Phosphatase 59 39 - 117 U/L    Total Bilirubin 0.3 0.2 - 1.2 mg/dL    eGFR Non African Amer 61 >60 mL/min/1.73    Globulin 2.6 gm/dL    A/G Ratio 1.8 g/dL    BUN/Creatinine Ratio 16.3 7.0 - 25.0    Anion Gap 12.1 5.0 - 15.0 mmol/L   Lipid Panel   Result Value Ref Range    Total Cholesterol 186 0 - 200 mg/dL    Triglycerides 104 0 - 150 mg/dL    HDL Cholesterol 65 (H) 40 - 60 mg/dL    LDL Cholesterol  100 0 - 100 mg/dL    VLDL Cholesterol 20.8 mg/dL    LDL/HDL Ratio 1.54    TSH   Result Value Ref Range    TSH 5.000 (H) 0.270 - 4.200 uIU/mL   Vitamin D 25 Hydroxy   Result Value Ref Range    25 Hydroxy, Vitamin D 62.0 30.0 - 100.0 ng/ml             Assessment/Plan   Diagnoses and all orders for this visit:    Medicare annual wellness visit, subsequent    Dyslipidemia  -     Comprehensive Metabolic Panel  -     Lipid Panel  -     TSH    History of anemia  -     CBC (No Diff)    Vitamin D insufficiency  -     Vitamin D 25 Hydroxy    Situational stress  Comments:  counseled , advised to increase exercise, join silver sneakers. she is not interested in pharmaceuticals. Adv. Dickens wort as an option.    Screening for  colon cancer  -     Ambulatory Referral For Screening Colonoscopy    call if she would like to try SSRIs.           PHQ-2/PHQ-9 Depression screening reviewed with patient . Total time spent today for depression screening  with Kathleen Goetz  was 15 minutes in counseling, along with plans for any diagnositc work-up and treatment.      Follow Up:  Return in about 1 year (around 11/1/2020) for Medicare Wellness.     An After Visit Summary and PPPS were given to the patient.

## 2020-06-17 ENCOUNTER — TRANSCRIBE ORDERS (OUTPATIENT)
Dept: ADMINISTRATIVE | Facility: HOSPITAL | Age: 70
End: 2020-06-17

## 2020-06-17 DIAGNOSIS — Z12.31 VISIT FOR SCREENING MAMMOGRAM: Primary | ICD-10-CM

## 2020-09-21 ENCOUNTER — HOSPITAL ENCOUNTER (OUTPATIENT)
Dept: MAMMOGRAPHY | Facility: HOSPITAL | Age: 70
Discharge: HOME | End: 2020-09-21
Admitting: INTERNAL MEDICINE

## 2020-09-21 DIAGNOSIS — Z12.31 VISIT FOR SCREENING MAMMOGRAM: ICD-10-CM

## 2020-09-21 PROCEDURE — 77063 BREAST TOMOSYNTHESIS BI: CPT | Performed by: RADIOLOGY

## 2020-09-21 PROCEDURE — 77063 BREAST TOMOSYNTHESIS BI: CPT

## 2020-09-21 PROCEDURE — 77067 SCR MAMMO BI INCL CAD: CPT | Performed by: RADIOLOGY

## 2020-09-21 PROCEDURE — 77067 SCR MAMMO BI INCL CAD: CPT

## 2020-11-04 ENCOUNTER — OFFICE VISIT (OUTPATIENT)
Dept: INTERNAL MEDICINE | Facility: CLINIC | Age: 70
End: 2020-11-04

## 2020-11-04 ENCOUNTER — LAB (OUTPATIENT)
Dept: LAB | Facility: HOSPITAL | Age: 70
End: 2020-11-04

## 2020-11-04 VITALS
HEIGHT: 59 IN | DIASTOLIC BLOOD PRESSURE: 80 MMHG | BODY MASS INDEX: 31.53 KG/M2 | WEIGHT: 156.4 LBS | HEART RATE: 63 BPM | OXYGEN SATURATION: 99 % | TEMPERATURE: 97 F | SYSTOLIC BLOOD PRESSURE: 104 MMHG

## 2020-11-04 DIAGNOSIS — E55.9 VITAMIN D INSUFFICIENCY: ICD-10-CM

## 2020-11-04 DIAGNOSIS — Z00.00 MEDICARE ANNUAL WELLNESS VISIT, SUBSEQUENT: Primary | ICD-10-CM

## 2020-11-04 DIAGNOSIS — Z86.2 HISTORY OF ANEMIA: ICD-10-CM

## 2020-11-04 DIAGNOSIS — E78.5 DYSLIPIDEMIA: ICD-10-CM

## 2020-11-04 LAB
25(OH)D3 SERPL-MCNC: 72.8 NG/ML (ref 30–100)
ALBUMIN SERPL-MCNC: 4.6 G/DL (ref 3.5–5.2)
ALBUMIN/GLOB SERPL: 2 G/DL
ALP SERPL-CCNC: 72 U/L (ref 39–117)
ALT SERPL W P-5'-P-CCNC: 14 U/L (ref 1–33)
ANION GAP SERPL CALCULATED.3IONS-SCNC: 10.8 MMOL/L (ref 5–15)
AST SERPL-CCNC: 20 U/L (ref 1–32)
BILIRUB SERPL-MCNC: 0.2 MG/DL (ref 0–1.2)
BUN SERPL-MCNC: 23 MG/DL (ref 8–23)
BUN/CREAT SERPL: 26.1 (ref 7–25)
CALCIUM SPEC-SCNC: 10.1 MG/DL (ref 8.6–10.5)
CHLORIDE SERPL-SCNC: 106 MMOL/L (ref 98–107)
CHOLEST SERPL-MCNC: 177 MG/DL (ref 0–200)
CO2 SERPL-SCNC: 26.2 MMOL/L (ref 22–29)
CREAT SERPL-MCNC: 0.88 MG/DL (ref 0.57–1)
DEPRECATED RDW RBC AUTO: 42.8 FL (ref 37–54)
ERYTHROCYTE [DISTWIDTH] IN BLOOD BY AUTOMATED COUNT: 12.8 % (ref 12.3–15.4)
GFR SERPL CREATININE-BSD FRML MDRD: 64 ML/MIN/1.73
GLOBULIN UR ELPH-MCNC: 2.3 GM/DL
GLUCOSE SERPL-MCNC: 83 MG/DL (ref 65–99)
HCT VFR BLD AUTO: 44 % (ref 34–46.6)
HDLC SERPL-MCNC: 73 MG/DL (ref 40–60)
HGB BLD-MCNC: 14.2 G/DL (ref 12–15.9)
LDLC SERPL CALC-MCNC: 92 MG/DL (ref 0–100)
LDLC/HDLC SERPL: 1.25 {RATIO}
MCH RBC QN AUTO: 29.3 PG (ref 26.6–33)
MCHC RBC AUTO-ENTMCNC: 32.3 G/DL (ref 31.5–35.7)
MCV RBC AUTO: 90.7 FL (ref 79–97)
PLATELET # BLD AUTO: 195 10*3/MM3 (ref 140–450)
PMV BLD AUTO: 12.3 FL (ref 6–12)
POTASSIUM SERPL-SCNC: 5.5 MMOL/L (ref 3.5–5.2)
PROT SERPL-MCNC: 6.9 G/DL (ref 6–8.5)
RBC # BLD AUTO: 4.85 10*6/MM3 (ref 3.77–5.28)
SODIUM SERPL-SCNC: 143 MMOL/L (ref 136–145)
TRIGL SERPL-MCNC: 65 MG/DL (ref 0–150)
TSH SERPL DL<=0.05 MIU/L-ACNC: 4.35 UIU/ML (ref 0.27–4.2)
VIT B12 BLD-MCNC: 667 PG/ML (ref 211–946)
VLDLC SERPL-MCNC: 12 MG/DL (ref 5–40)
WBC # BLD AUTO: 6.34 10*3/MM3 (ref 3.4–10.8)

## 2020-11-04 PROCEDURE — 80053 COMPREHEN METABOLIC PANEL: CPT | Performed by: INTERNAL MEDICINE

## 2020-11-04 PROCEDURE — 84443 ASSAY THYROID STIM HORMONE: CPT | Performed by: INTERNAL MEDICINE

## 2020-11-04 PROCEDURE — G0439 PPPS, SUBSEQ VISIT: HCPCS | Performed by: INTERNAL MEDICINE

## 2020-11-04 PROCEDURE — G0444 DEPRESSION SCREEN ANNUAL: HCPCS | Performed by: INTERNAL MEDICINE

## 2020-11-04 PROCEDURE — 82607 VITAMIN B-12: CPT | Performed by: INTERNAL MEDICINE

## 2020-11-04 PROCEDURE — 82306 VITAMIN D 25 HYDROXY: CPT | Performed by: INTERNAL MEDICINE

## 2020-11-04 PROCEDURE — 85027 COMPLETE CBC AUTOMATED: CPT | Performed by: INTERNAL MEDICINE

## 2020-11-04 PROCEDURE — 80061 LIPID PANEL: CPT | Performed by: INTERNAL MEDICINE

## 2020-11-04 NOTE — PROGRESS NOTES
The ABCs of the Annual Wellness Visit  Subsequent Medicare Wellness Visit    Chief Complaint   Patient presents with   • Medicare Wellness-subsequent       Subjective   History of Present Illness:  Kathleen Goetz is a 70 y.o. female who presents for a Subsequent Medicare Wellness Visit.    HEALTH RISK ASSESSMENT    Recent Hospitalizations:  No hospitalization(s) within the last year.    Current Medical Providers:  Patient Care Team:  Rachele Paulino MD as PCP - General  Rachele Paulino MD as PCP - Family Medicine    Smoking Status:  Social History     Tobacco Use   Smoking Status Never Smoker   Smokeless Tobacco Never Used   Tobacco Comment    My  smoked a pipe during the first 4 months of our marriage in 1977       Alcohol Consumption:  Social History     Substance and Sexual Activity   Alcohol Use Yes    Comment: A glass of wine on special occasions 6-9 times per year       Depression Screen:   PHQ-2/PHQ-9 Depression Screening 11/4/2020   Little interest or pleasure in doing things 0   Feeling down, depressed, or hopeless 0   Trouble falling or staying asleep, or sleeping too much -   Feeling tired or having little energy -   Poor appetite or overeating -   Feeling bad about yourself - or that you are a failure or have let yourself or your family down -   Trouble concentrating on things, such as reading the newspaper or watching television -   Moving or speaking so slowly that other people could have noticed. Or the opposite - being so fidgety or restless that you have been moving around a lot more than usual -   Thoughts that you would be better off dead, or of hurting yourself in some way -   Total Score 0   If you checked off any problems, how difficult have these problems made it for you to do your work, take care of things at home, or get along with other people? -       Fall Risk Screen:  STEADI Fall Risk Assessment was completed, and patient is at MODERATE risk for falls. Assessment  completed on:11/4/2020    Health Habits and Functional and Cognitive Screening:  Functional & Cognitive Status 11/4/2020   Do you have difficulty preparing food and eating? No   Do you have difficulty bathing yourself, getting dressed or grooming yourself? No   Do you have difficulty using the toilet? No   Do you have difficulty moving around from place to place? No   Do you have trouble with steps or getting out of a bed or a chair? No   Current Diet Well Balanced Diet   Dental Exam Up to date   Eye Exam Up to date   Exercise (times per week) 7 times per week   Current Exercise Activities Include Walking   Do you need help using the phone?  No   Are you deaf or do you have serious difficulty hearing?  No   Do you need help with transportation? No   Do you need help shopping? No   Do you need help preparing meals?  No   Do you need help with housework?  No   Do you need help with laundry? No   Do you need help taking your medications? No   Do you need help managing money? No   Do you ever drive or ride in a car without wearing a seat belt? No   Have you felt unusual stress, anger or loneliness in the last month? No   Who do you live with? Alone   If you need help, do you have trouble finding someone available to you? No   Have you been bothered in the last four weeks by sexual problems? No   Do you have difficulty concentrating, remembering or making decisions? No         Does the patient have evidence of cognitive impairment? No    Asprin use counseling:Does not need ASA (and currently is not on it)    Age-appropriate Screening Schedule:  Refer to the list below for future screening recommendations based on patient's age, sex and/or medical conditions. Orders for these recommended tests are listed in the plan section. The patient has been provided with a written plan.    Health Maintenance   Topic Date Due   • LIPID PANEL  11/04/2021   • DXA SCAN  08/07/2022   • MAMMOGRAM  09/21/2022   • COLONOSCOPY  12/12/2024   •  TDAP/TD VACCINES (2 - Td) 05/09/2027   • INFLUENZA VACCINE  Completed   • ZOSTER VACCINE  Completed          The following portions of the patient's history were reviewed and updated as appropriate: allergies, current medications, past family history, past medical history, past social history, past surgical history and problem list.    Outpatient Medications Prior to Visit   Medication Sig Dispense Refill   • Calcium Carbonate (CALTRATE 600) 1500 (600 CA) MG tablet Take 1 tablet by mouth 2 (Two) Times a Day With Meals.     • Misc Natural Products (OSTEO BI-FLEX TRIPLE STRENGTH) tablet Take 1 tablet by mouth 2 (two) times a day.     • Multiple Vitamin (DAILY VITAMIN) tablet Take 1 tablet by mouth daily.     • naproxen sodium (ALEVE) 220 MG tablet Take 1 tablet by mouth 2 (Two) Times a Day.     • Turmeric 450 MG capsule Take 1 capsule by mouth Daily.       No facility-administered medications prior to visit.        Patient Active Problem List   Diagnosis   • Arthritis   • Thumb pain   • Annual physical exam       Advanced Care Planning:  ACP discussion was held with the patient during this visit. Patient does not have an advance directive, information provided.    Review of Systems   Constitutional: Negative for chills and fatigue.   HENT: Negative for congestion, ear pain and sore throat.    Eyes: Negative for pain, redness and visual disturbance.   Respiratory: Negative for cough and shortness of breath.    Cardiovascular: Negative for chest pain, palpitations and leg swelling.   Gastrointestinal: Negative for abdominal pain, diarrhea and nausea.   Endocrine: Negative for cold intolerance and heat intolerance.   Genitourinary: Negative for flank pain and urgency.   Musculoskeletal: Negative for arthralgias and gait problem.   Skin: Negative for pallor and rash.   Neurological: Negative for dizziness, weakness and headaches.   Psychiatric/Behavioral: Negative for dysphoric mood and sleep disturbance. The patient is  "not nervous/anxious.        Compared to one year ago, the patient feels her physical health is better.  Compared to one year ago, the patient feels her mental health is better.    Reviewed chart for potential of high risk medication in the elderly: yes  Reviewed chart for potential of harmful drug interactions in the elderly:yes    Objective         Vitals:    11/04/20 0752   BP: 104/80   Pulse: 63   Temp: 97 °F (36.1 °C)   SpO2: 99%  Comment: ra   Weight: 70.9 kg (156 lb 6.4 oz)   Height: 149.9 cm (59\")   PainSc: 0-No pain       Body mass index is 31.59 kg/m².  Discussed the patient's BMI with her. The BMI is in the acceptable range.    Physical Exam  Vitals signs and nursing note reviewed.   Constitutional:       Appearance: Normal appearance. She is well-developed.   HENT:      Head: Normocephalic and atraumatic.      Right Ear: Tympanic membrane and external ear normal.      Left Ear: Tympanic membrane and external ear normal.      Mouth/Throat:      Pharynx: No oropharyngeal exudate.   Eyes:      Conjunctiva/sclera: Conjunctivae normal.      Pupils: Pupils are equal, round, and reactive to light.   Neck:      Musculoskeletal: Neck supple.      Thyroid: No thyromegaly.      Vascular: No carotid bruit.   Cardiovascular:      Rate and Rhythm: Normal rate and regular rhythm.      Heart sounds: No murmur. No friction rub. No gallop.    Pulmonary:      Effort: Pulmonary effort is normal.      Breath sounds: Normal breath sounds. No rhonchi or rales.   Abdominal:      General: Bowel sounds are normal. There is no distension.      Palpations: Abdomen is soft. There is no mass.      Tenderness: There is no abdominal tenderness. There is no guarding or rebound.      Hernia: No hernia is present.   Musculoskeletal:      Right lower leg: No edema.      Left lower leg: No edema.   Skin:     General: Skin is warm and dry.      Findings: No erythema or lesion.   Neurological:      General: No focal deficit present.      " Mental Status: She is alert and oriented to person, place, and time.      Cranial Nerves: No cranial nerve deficit.      Sensory: No sensory deficit.      Coordination: Coordination normal.      Gait: Gait normal.      Deep Tendon Reflexes: Reflexes normal.   Psychiatric:         Mood and Affect: Mood normal.         Behavior: Behavior normal.         Judgment: Judgment normal.         Lab Results   Component Value Date    TRIG 65 11/04/2020    HDL 73 (H) 11/04/2020    LDL 92 11/04/2020    VLDL 12 11/04/2020        Assessment/Plan   Medicare Risks and Personalized Health Plan  CMS Preventative Services Quick Reference  Advance Directive Discussion  Breast Cancer/Mammogram Screening  Depression/Dysphoria  Immunizations Discussed/Encouraged (specific immunizations; Hepatitis A Vaccine/Series )  Osteoprorosis Risk    The above risks/problems have been discussed with the patient.  Pertinent information has been shared with the patient in the After Visit Summary.  Follow up plans and orders are seen below in the Assessment/Plan Section.    Medicare Wellness-subsequent    Subjective   Kathleen Goetz is a 70 y.o. female is here today for follow-up.  HPI   is s/p BMT, for myeloma. Did okay for 6 mos, but now has a mass in his abdomen with mets. Has a PET scan scheduled for the 15th.         Current Outpatient Medications:   •  Calcium Carbonate (CALTRATE 600) 1500 (600 CA) MG tablet, Take 1 tablet by mouth 2 (Two) Times a Day With Meals., Disp: , Rfl:   •  Misc Natural Products (OSTEO BI-FLEX TRIPLE STRENGTH) tablet, Take 1 tablet by mouth 2 (two) times a day., Disp: , Rfl:   •  Multiple Vitamin (DAILY VITAMIN) tablet, Take 1 tablet by mouth daily., Disp: , Rfl:   •  naproxen sodium (ALEVE) 220 MG tablet, Take 1 tablet by mouth 2 (Two) Times a Day., Disp: , Rfl:   •  Turmeric 450 MG capsule, Take 1 capsule by mouth Daily., Disp: , Rfl:       The following portions of the patient's history were reviewed and  "updated as appropriate: allergies, current medications, past family history, past medical history, past social history, past surgical history and problem list.        Objective   /80   Pulse 63   Temp 97 °F (36.1 °C)   Ht 149.9 cm (59\")   Wt 70.9 kg (156 lb 6.4 oz)   SpO2 99% Comment: ra  BMI 31.59 kg/m²         Results for orders placed or performed in visit on 11/01/19   CBC (No Diff)    Specimen: Blood   Result Value Ref Range    WBC 5.48 3.40 - 10.80 10*3/mm3    RBC 4.67 3.77 - 5.28 10*6/mm3    Hemoglobin 14.1 12.0 - 15.9 g/dL    Hematocrit 41.1 34.0 - 46.6 %    MCV 88.0 79.0 - 97.0 fL    MCH 30.2 26.6 - 33.0 pg    MCHC 34.3 31.5 - 35.7 g/dL    RDW 12.5 12.3 - 15.4 %    RDW-SD 39.6 37.0 - 54.0 fl    MPV 12.4 (H) 6.0 - 12.0 fL    Platelets 187 140 - 450 10*3/mm3   Comprehensive Metabolic Panel    Specimen: Blood   Result Value Ref Range    Glucose 86 65 - 99 mg/dL    BUN 15 8 - 23 mg/dL    Creatinine 0.92 0.57 - 1.00 mg/dL    Sodium 143 136 - 145 mmol/L    Potassium 4.8 3.5 - 5.2 mmol/L    Chloride 103 98 - 107 mmol/L    CO2 27.9 22.0 - 29.0 mmol/L    Calcium 9.9 8.6 - 10.5 mg/dL    Total Protein 7.3 6.0 - 8.5 g/dL    Albumin 4.70 3.50 - 5.20 g/dL    ALT (SGPT) 13 1 - 33 U/L    AST (SGOT) 19 1 - 32 U/L    Alkaline Phosphatase 59 39 - 117 U/L    Total Bilirubin 0.3 0.2 - 1.2 mg/dL    eGFR Non African Amer 61 >60 mL/min/1.73    Globulin 2.6 gm/dL    A/G Ratio 1.8 g/dL    BUN/Creatinine Ratio 16.3 7.0 - 25.0    Anion Gap 12.1 5.0 - 15.0 mmol/L   Lipid Panel    Specimen: Blood   Result Value Ref Range    Total Cholesterol 186 0 - 200 mg/dL    Triglycerides 104 0 - 150 mg/dL    HDL Cholesterol 65 (H) 40 - 60 mg/dL    LDL Cholesterol  100 0 - 100 mg/dL    VLDL Cholesterol 20.8 mg/dL    LDL/HDL Ratio 1.54    TSH    Specimen: Blood   Result Value Ref Range    TSH 5.000 (H) 0.270 - 4.200 uIU/mL   Vitamin D 25 Hydroxy    Specimen: Blood   Result Value Ref Range    25 Hydroxy, Vitamin D 62.0 30.0 - 100.0 ng/ml "             Assessment/Plan   Diagnoses and all orders for this visit:    Medicare annual wellness visit, subsequent    Dyslipidemia  -     Comprehensive Metabolic Panel; Future  -     Lipid Panel; Future  -     TSH; Future    History of anemia  -     CBC (No Diff); Future  -     Vitamin B12; Future    Vitamin D insufficiency  -     Vitamin D 25 Hydroxy; Future               PHQ-2/PHQ-9 Depression screening reviewed with patient . Total time spent today for depression screening  with Kathleen Goetz  was 15 minutes in counseling, along with plans for any diagnositc work-up and treatment.  Follow Up:  Return in about 1 year (around 11/4/2021) for Medicare Wellness.     An After Visit Summary and PPPS were given to the patient.

## 2021-03-01 ENCOUNTER — IMMUNIZATION (OUTPATIENT)
Dept: VACCINE CLINIC | Facility: HOSPITAL | Age: 71
End: 2021-03-01

## 2021-03-01 PROCEDURE — 0001A: CPT | Performed by: INTERNAL MEDICINE

## 2021-03-01 PROCEDURE — 91300 HC SARSCOV02 VAC 30MCG/0.3ML IM: CPT | Performed by: INTERNAL MEDICINE

## 2021-03-22 ENCOUNTER — IMMUNIZATION (OUTPATIENT)
Dept: VACCINE CLINIC | Facility: HOSPITAL | Age: 71
End: 2021-03-22

## 2021-03-22 PROCEDURE — 0002A: CPT | Performed by: INTERNAL MEDICINE

## 2021-03-22 PROCEDURE — 91300 HC SARSCOV02 VAC 30MCG/0.3ML IM: CPT | Performed by: INTERNAL MEDICINE

## 2021-08-20 ENCOUNTER — TRANSCRIBE ORDERS (OUTPATIENT)
Dept: ADMINISTRATIVE | Facility: HOSPITAL | Age: 71
End: 2021-08-20

## 2021-08-20 DIAGNOSIS — Z12.31 VISIT FOR SCREENING MAMMOGRAM: Primary | ICD-10-CM

## 2021-09-23 ENCOUNTER — HOSPITAL ENCOUNTER (OUTPATIENT)
Dept: MAMMOGRAPHY | Facility: HOSPITAL | Age: 71
Discharge: HOME | End: 2021-09-23
Admitting: INTERNAL MEDICINE

## 2021-09-23 DIAGNOSIS — Z12.31 VISIT FOR SCREENING MAMMOGRAM: ICD-10-CM

## 2021-09-23 PROCEDURE — 77063 BREAST TOMOSYNTHESIS BI: CPT

## 2021-09-23 PROCEDURE — 77067 SCR MAMMO BI INCL CAD: CPT

## 2021-09-23 PROCEDURE — 77067 SCR MAMMO BI INCL CAD: CPT | Performed by: RADIOLOGY

## 2021-09-23 PROCEDURE — 77063 BREAST TOMOSYNTHESIS BI: CPT | Performed by: RADIOLOGY

## 2021-11-08 ENCOUNTER — OFFICE VISIT (OUTPATIENT)
Dept: INTERNAL MEDICINE | Facility: CLINIC | Age: 71
End: 2021-11-08

## 2021-11-08 ENCOUNTER — LAB (OUTPATIENT)
Dept: LAB | Facility: HOSPITAL | Age: 71
End: 2021-11-08

## 2021-11-08 VITALS
BODY MASS INDEX: 31.14 KG/M2 | OXYGEN SATURATION: 97 % | DIASTOLIC BLOOD PRESSURE: 80 MMHG | SYSTOLIC BLOOD PRESSURE: 122 MMHG | WEIGHT: 158.6 LBS | HEART RATE: 62 BPM | HEIGHT: 60 IN

## 2021-11-08 DIAGNOSIS — Z86.2 HISTORY OF ANEMIA: ICD-10-CM

## 2021-11-08 DIAGNOSIS — Z00.00 MEDICARE ANNUAL WELLNESS VISIT, SUBSEQUENT: Primary | ICD-10-CM

## 2021-11-08 DIAGNOSIS — R06.09 DOE (DYSPNEA ON EXERTION): ICD-10-CM

## 2021-11-08 DIAGNOSIS — E78.5 DYSLIPIDEMIA: ICD-10-CM

## 2021-11-08 DIAGNOSIS — E55.9 VITAMIN D INSUFFICIENCY: ICD-10-CM

## 2021-11-08 DIAGNOSIS — J30.89 ALLERGIC RHINITIS DUE TO OTHER ALLERGIC TRIGGER, UNSPECIFIED SEASONALITY: ICD-10-CM

## 2021-11-08 DIAGNOSIS — M19.072 ARTHRITIS OF FIRST METATARSOPHALANGEAL (MTP) JOINT OF LEFT FOOT: ICD-10-CM

## 2021-11-08 DIAGNOSIS — E03.8 OTHER SPECIFIED HYPOTHYROIDISM: ICD-10-CM

## 2021-11-08 LAB
25(OH)D3 SERPL-MCNC: 62.4 NG/ML (ref 30–100)
DEPRECATED RDW RBC AUTO: 41.3 FL (ref 37–54)
ERYTHROCYTE [DISTWIDTH] IN BLOOD BY AUTOMATED COUNT: 12.6 % (ref 12.3–15.4)
HCT VFR BLD AUTO: 43.2 % (ref 34–46.6)
HGB BLD-MCNC: 14.6 G/DL (ref 12–15.9)
MCH RBC QN AUTO: 30.2 PG (ref 26.6–33)
MCHC RBC AUTO-ENTMCNC: 33.8 G/DL (ref 31.5–35.7)
MCV RBC AUTO: 89.3 FL (ref 79–97)
PLATELET # BLD AUTO: 177 10*3/MM3 (ref 140–450)
PMV BLD AUTO: 12 FL (ref 6–12)
RBC # BLD AUTO: 4.84 10*6/MM3 (ref 3.77–5.28)
WBC # BLD AUTO: 6.28 10*3/MM3 (ref 3.4–10.8)

## 2021-11-08 PROCEDURE — 84439 ASSAY OF FREE THYROXINE: CPT

## 2021-11-08 PROCEDURE — G0439 PPPS, SUBSEQ VISIT: HCPCS | Performed by: INTERNAL MEDICINE

## 2021-11-08 PROCEDURE — 80061 LIPID PANEL: CPT

## 2021-11-08 PROCEDURE — 80053 COMPREHEN METABOLIC PANEL: CPT

## 2021-11-08 PROCEDURE — 85027 COMPLETE CBC AUTOMATED: CPT

## 2021-11-08 PROCEDURE — 93000 ELECTROCARDIOGRAM COMPLETE: CPT | Performed by: INTERNAL MEDICINE

## 2021-11-08 PROCEDURE — G0444 DEPRESSION SCREEN ANNUAL: HCPCS | Performed by: INTERNAL MEDICINE

## 2021-11-08 PROCEDURE — 84443 ASSAY THYROID STIM HORMONE: CPT

## 2021-11-08 PROCEDURE — 99214 OFFICE O/P EST MOD 30 MIN: CPT | Performed by: INTERNAL MEDICINE

## 2021-11-08 PROCEDURE — 1125F AMNT PAIN NOTED PAIN PRSNT: CPT | Performed by: INTERNAL MEDICINE

## 2021-11-08 PROCEDURE — 1160F RVW MEDS BY RX/DR IN RCRD: CPT | Performed by: INTERNAL MEDICINE

## 2021-11-08 PROCEDURE — 1170F FXNL STATUS ASSESSED: CPT | Performed by: INTERNAL MEDICINE

## 2021-11-08 PROCEDURE — 82306 VITAMIN D 25 HYDROXY: CPT

## 2021-11-08 NOTE — PROGRESS NOTES
The ABCs of the Annual Wellness Visit  Subsequent Medicare Wellness Visit    Chief Complaint   Patient presents with   • Medicare Wellness-subsequent      Subjective    History of Present Illness:  Kathleen Goetz is a 71 y.o. female who presents for a Subsequent Medicare Wellness Visit.    The following portions of the patient's history were reviewed and   updated as appropriate: allergies, current medications, past family history, past medical history, past social history, past surgical history and problem list.    Compared to one year ago, the patient feels her physical   health is the same.    Compared to one year ago, the patient feels her mental   health is worse.    Recent Hospitalizations:  She was not admitted to the hospital during the last year.       Current Medical Providers:  Patient Care Team:  Rachele Paulino MD as PCP - General  Rachele Paulino MD as PCP - Family Medicine    Outpatient Medications Prior to Visit   Medication Sig Dispense Refill   • Calcium Carbonate (CALTRATE 600) 1500 (600 CA) MG tablet Take 1 tablet by mouth 2 (Two) Times a Day With Meals.     • Misc Natural Products (OSTEO BI-FLEX TRIPLE STRENGTH) tablet Take 1 tablet by mouth 2 (two) times a day.     • Multiple Vitamin (DAILY VITAMIN) tablet Take 1 tablet by mouth daily.     • mupirocin (BACTROBAN) 2 % ointment      • naproxen sodium (ALEVE) 220 MG tablet Take 1 tablet by mouth 2 (Two) Times a Day.     • TURMERIC PO Take 1 capsule by mouth Daily.     • doxycycline (Vibramycin) 100 MG capsule Take 1 capsule by mouth 2 (Two) Times a Day. 14 capsule 0   • triamcinolone (KENALOG) 0.1 % ointment Apply  topically to the appropriate area as directed 2 (Two) Times a Day. 80 g 0     No facility-administered medications prior to visit.       No opioid medication identified on active medication list. I have reviewed chart for other potential  high risk medication/s and harmful drug interactions in the elderly.          Aspirin is  "not on active medication list.  Aspirin use is not indicated based on review of current medical condition/s. Risk of harm outweighs potential benefits.  .    Patient Active Problem List   Diagnosis   • Arthritis   • Thumb pain   • Annual physical exam     Advance Care Planning  Advance Directive is not on file.  ACP discussion was held with the patient during this visit. Patient has an advance directive (not in EMR), copy requested.    Review of Systems   Constitutional: Negative for chills and fever.   HENT: Negative for congestion, ear pain and sore throat.    Eyes: Negative for pain, redness and visual disturbance.   Respiratory: Negative for cough and shortness of breath.    Cardiovascular: Negative for chest pain, palpitations and leg swelling.   Gastrointestinal: Negative for abdominal pain, diarrhea and nausea.   Endocrine: Negative for cold intolerance and heat intolerance.   Genitourinary: Negative for flank pain and urgency.   Musculoskeletal: Positive for arthralgias (foot pain). Negative for gait problem.   Skin: Negative for pallor and rash.   Neurological: Negative for dizziness and weakness.   Psychiatric/Behavioral: Negative for dysphoric mood and sleep disturbance. The patient is not nervous/anxious.         Objective    Vitals:    11/08/21 0832   BP: 122/80   Pulse: 62   SpO2: 97%  Comment: ra   Weight: 71.9 kg (158 lb 9.6 oz)   Height: 152.4 cm (60\")   PainSc:   2   PainLoc: Foot  Comment: bilateral     BMI Readings from Last 1 Encounters:   11/08/21 30.97 kg/m²   BMI is above normal parameters. Recommendations include: exercise counseling and nutrition counseling    Does the patient have evidence of cognitive impairment? No    Physical Exam  Constitutional:       General: She is not in acute distress.     Appearance: Normal appearance.   HENT:      Head: Normocephalic and atraumatic.      Right Ear: Tympanic membrane and external ear normal.      Left Ear: Tympanic membrane and external ear " normal.      Nose: Nose normal.      Mouth/Throat:      Mouth: Mucous membranes are moist.   Eyes:      General: No scleral icterus.  Neck:      Vascular: No carotid bruit.   Cardiovascular:      Rate and Rhythm: Normal rate and regular rhythm.      Pulses: Normal pulses.      Heart sounds: Normal heart sounds. No murmur heard.  No friction rub. No gallop.    Pulmonary:      Effort: Pulmonary effort is normal.      Breath sounds: Normal breath sounds. No rhonchi or rales.   Abdominal:      General: Bowel sounds are normal. There is no distension.      Palpations: Abdomen is soft.      Tenderness: There is no right CVA tenderness, left CVA tenderness, guarding or rebound.      Hernia: No hernia is present.   Musculoskeletal:         General: No tenderness. Normal range of motion.      Cervical back: Normal range of motion.      Right lower leg: No edema.      Left lower leg: No edema.   Lymphadenopathy:      Cervical: No cervical adenopathy.   Skin:     General: Skin is warm.      Findings: No rash.   Neurological:      General: No focal deficit present.      Mental Status: She is alert and oriented to person, place, and time. Mental status is at baseline.      Cranial Nerves: No cranial nerve deficit.      Sensory: No sensory deficit.      Coordination: Coordination normal.      Gait: Gait normal.      Deep Tendon Reflexes: Reflexes normal.   Psychiatric:         Mood and Affect: Mood normal.         Behavior: Behavior normal.                 HEALTH RISK ASSESSMENT    Smoking Status:  Social History     Tobacco Use   Smoking Status Never Smoker   Smokeless Tobacco Never Used   Tobacco Comment    My  smoked a pipe during the first 4 months of our marriage in 1977     Alcohol Consumption:  Social History     Substance and Sexual Activity   Alcohol Use Not Currently   • Alcohol/week: 0.0 standard drinks    Comment: None for at least a year and a half; formerly 6-9 glasses/yr     Fall Risk Screen:    STEADI Fall  Risk Assessment was completed, and patient is at HIGH risk for falls. Assessment completed on:11/8/2021    Depression Screening:  PHQ-2/PHQ-9 Depression Screening 11/8/2021   Little interest or pleasure in doing things 1   Feeling down, depressed, or hopeless 0   Trouble falling or staying asleep, or sleeping too much -   Feeling tired or having little energy -   Poor appetite or overeating -   Feeling bad about yourself - or that you are a failure or have let yourself or your family down -   Trouble concentrating on things, such as reading the newspaper or watching television -   Moving or speaking so slowly that other people could have noticed. Or the opposite - being so fidgety or restless that you have been moving around a lot more than usual -   Thoughts that you would be better off dead, or of hurting yourself in some way -   Total Score 1   If you checked off any problems, how difficult have these problems made it for you to do your work, take care of things at home, or get along with other people? -       Health Habits and Functional and Cognitive Screening:  Functional & Cognitive Status 11/8/2021   Do you have difficulty preparing food and eating? No   Do you have difficulty bathing yourself, getting dressed or grooming yourself? No   Do you have difficulty using the toilet? No   Do you have difficulty moving around from place to place? No   Do you have trouble with steps or getting out of a bed or a chair? No   Current Diet Well Balanced Diet   Dental Exam Up to date   Eye Exam Up to date   Exercise (times per week) 2 times per week   Current Exercises Include Walking   Current Exercise Activities Include -   Do you need help using the phone?  Yes   Are you deaf or do you have serious difficulty hearing?  No   Do you need help with transportation? No   Do you need help shopping? No   Do you need help preparing meals?  No   Do you need help with housework?  No   Do you need help with laundry? No   Do you  need help taking your medications? No   Do you need help managing money? No   Do you ever drive or ride in a car without wearing a seat belt? No   Have you felt unusual stress, anger or loneliness in the last month? No   Who do you live with? Other   If you need help, do you have trouble finding someone available to you? No   Have you been bothered in the last four weeks by sexual problems? No   Do you have difficulty concentrating, remembering or making decisions? No       Age-appropriate Screening Schedule:  Refer to the list below for future screening recommendations based on patient's age, sex and/or medical conditions. Orders for these recommended tests are listed in the plan section. The patient has been provided with a written plan.    Health Maintenance   Topic Date Due   • LIPID PANEL  11/04/2021   • PAP SMEAR  06/18/2022   • DXA SCAN  08/07/2022   • MAMMOGRAM  09/23/2023   • TDAP/TD VACCINES (2 - Td or Tdap) 05/09/2027   • INFLUENZA VACCINE  Completed   • ZOSTER VACCINE  Completed              Assessment/Plan   CMS Preventative Services Quick Reference  Risk Factors Identified During Encounter  Cardiovascular Disease  Depression/Dysphoria  Fall Risk-High or Moderate  Immunizations Discussed/Encouraged (specific Immunizations; Influenza  The above risks/problems have been discussed with the patient.  Follow up actions/plans if indicated are seen below in the Assessment/Plan Section.  Pertinent information has been shared with the patient in the After Visit Summary.    Medicare Wellness-subsequent    Subjective   Kathleen Goetz is a 71 y.o. female is here today for follow-up.  HPI  72 yo  at [facility] [employer]/ [facility] and ,Feet hurt all the time. Tried different shoes with partial relief. Has arthritis in her feet and toes, went to [facility] and got a boot last year. Worse now as she has started walking after the yard work is done.  Having some increased soa when she climbs stairs, but did gain  "appx 8 lbs.   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 Was told on 11/5 last year that he had weeks to live.  She is by herself, and no family around her as 2 older sisters passed away.no parents or inlaws.  Has 1 nephew and  has 3 nephews.thinks will put her 2nd cousin as her health care by proxy.  Working with a grief therapist with hospice.but runs out in a couple of months.      Current Outpatient Medications:   •  Calcium Carbonate (CALTRATE 600) 1500 (600 CA) MG tablet, Take 1 tablet by mouth 2 (Two) Times a Day With Meals., Disp: , Rfl:   •  Misc Natural Products (OSTEO BI-FLEX TRIPLE STRENGTH) tablet, Take 1 tablet by mouth 2 (two) times a day., Disp: , Rfl:   •  Multiple Vitamin (DAILY VITAMIN) tablet, Take 1 tablet by mouth daily., Disp: , Rfl:   •  mupirocin (BACTROBAN) 2 % ointment, , Disp: , Rfl:   •  naproxen sodium (ALEVE) 220 MG tablet, Take 1 tablet by mouth 2 (Two) Times a Day., Disp: , Rfl:   •  TURMERIC PO, Take 1 capsule by mouth Daily., Disp: , Rfl:       The following portions of the patient's history were reviewed and updated as appropriate: allergies, current medications, past family history, past medical history, past social history, past surgical history and problem list.        Objective   /80   Pulse 62   Ht 152.4 cm (60\")   Wt 71.9 kg (158 lb 9.6 oz)   SpO2 97% Comment: ra  BMI 30.97 kg/m²         Results for orders placed or performed in visit on 11/04/20   CBC (No Diff)    Specimen: Blood   Result Value Ref Range    WBC 6.34 3.40 - 10.80 10*3/mm3    RBC 4.85 3.77 - 5.28 10*6/mm3    Hemoglobin 14.2 12.0 - 15.9 g/dL    Hematocrit 44.0 34.0 - 46.6 %    MCV 90.7 79.0 - 97.0 fL    MCH 29.3 26.6 - 33.0 pg    MCHC 32.3 31.5 - 35.7 g/dL    RDW 12.8 12.3 - 15.4 %    RDW-SD 42.8 37.0 - 54.0 fl    MPV 12.3 (H) 6.0 - 12.0 fL    Platelets 195 140 - 450 10*3/mm3   Comprehensive Metabolic Panel    Specimen: Blood   Result Value " Ref Range    Glucose 83 65 - 99 mg/dL    BUN 23 8 - 23 mg/dL    Creatinine 0.88 0.57 - 1.00 mg/dL    Sodium 143 136 - 145 mmol/L    Potassium 5.5 (H) 3.5 - 5.2 mmol/L    Chloride 106 98 - 107 mmol/L    CO2 26.2 22.0 - 29.0 mmol/L    Calcium 10.1 8.6 - 10.5 mg/dL    Total Protein 6.9 6.0 - 8.5 g/dL    Albumin 4.60 3.50 - 5.20 g/dL    ALT (SGPT) 14 1 - 33 U/L    AST (SGOT) 20 1 - 32 U/L    Alkaline Phosphatase 72 39 - 117 U/L    Total Bilirubin 0.2 0.0 - 1.2 mg/dL    eGFR Non African Amer 64 >60 mL/min/1.73    Globulin 2.3 gm/dL    A/G Ratio 2.0 g/dL    BUN/Creatinine Ratio 26.1 (H) 7.0 - 25.0    Anion Gap 10.8 5.0 - 15.0 mmol/L   Lipid Panel    Specimen: Blood   Result Value Ref Range    Total Cholesterol 177 0 - 200 mg/dL    Triglycerides 65 0 - 150 mg/dL    HDL Cholesterol 73 (H) 40 - 60 mg/dL    LDL Cholesterol  92 0 - 100 mg/dL    VLDL Cholesterol 12 5 - 40 mg/dL    LDL/HDL Ratio 1.25    TSH    Specimen: Blood   Result Value Ref Range    TSH 4.350 (H) 0.270 - 4.200 uIU/mL   Vitamin D 25 Hydroxy    Specimen: Blood   Result Value Ref Range    25 Hydroxy, Vitamin D 72.8 30.0 - 100.0 ng/ml   Vitamin B12    Specimen: Blood   Result Value Ref Range    Vitamin B-12 667 211 - 946 pg/mL         ECG 12 Lead    Date/Time: 11/8/2021 9:43 AM  Performed by: Rachele Paulino MD  Authorized by: Rachele Paulino MD   Comparison: not compared with previous ECG   Previous ECG: no previous ECG available  Rhythm: sinus bradycardia  Rate: normal  Conduction: conduction normal  ST Segments: ST segments normal  T Waves: T waves normal  QRS axis: normal  Other: no other findings    Clinical impression: normal ECG              Assessment/Plan   Diagnoses and all orders for this visit:    Medicare annual wellness visit, subsequent    Dyslipidemia  -     Comprehensive Metabolic Panel; Future  -     Lipid Panel; Future    Vitamin D insufficiency  -     Vitamin D 25 Hydroxy; Future    History of anemia  -     CBC (No Diff);  Future    Arthritis of first metatarsophalangeal (MTP) joint of left foot  -     Ambulatory Referral to Podiatry    Other specified hypothyroidism  Comments:  star meds if tsh > 5  Orders:  -     TSH; Future  -     T4, Free; Future    Allergic rhinitis due to other allergic trigger, unspecified seasonality  Comments:  start zyrtec    ESPITIA (dyspnea on exertion)  Comments:  ekg ok, start zyrtec, cinb will proceed with pfts and echo.  Orders:  -     ECG 12 Lead    Other orders  -     mupirocin (BACTROBAN) 2 % ointment             Try inserts from [employer]  PHQ-2/PHQ-9 Depression screening reviewed with patient . Total time spent today for depression screening  with Kathleen Goetz  was 15 minutes in counseling, along with plans for any diagnositc work-up and treatment.       Follow Up:   Return in about 1 year (around 11/8/2022) for Medicare Wellness.     An After Visit Summary and PPPS were made available to the patient.        I spent 30  minutes caring for Kathleen on this date of service. This time includes time spent by me in the following activities:obtaining and/or reviewing a separately obtained history, performing a medically appropriate examination and/or evaluation , counseling and educating the patient/family/caregiver, ordering medications, tests, or procedures, referring and communicating with other health care professionals  and documenting information in the medical record         Electronically signed by:    Rachele Paulino MD

## 2021-11-09 LAB
ALBUMIN SERPL-MCNC: 4.8 G/DL (ref 3.5–5.2)
ALBUMIN/GLOB SERPL: 1.8 G/DL
ALP SERPL-CCNC: 69 U/L (ref 39–117)
ALT SERPL W P-5'-P-CCNC: 14 U/L (ref 1–33)
ANION GAP SERPL CALCULATED.3IONS-SCNC: 8.6 MMOL/L (ref 5–15)
AST SERPL-CCNC: 19 U/L (ref 1–32)
BILIRUB SERPL-MCNC: 0.3 MG/DL (ref 0–1.2)
BUN SERPL-MCNC: 18 MG/DL (ref 8–23)
BUN/CREAT SERPL: 18.4 (ref 7–25)
CALCIUM SPEC-SCNC: 10.2 MG/DL (ref 8.6–10.5)
CHLORIDE SERPL-SCNC: 104 MMOL/L (ref 98–107)
CHOLEST SERPL-MCNC: 202 MG/DL (ref 0–200)
CO2 SERPL-SCNC: 29.4 MMOL/L (ref 22–29)
CREAT SERPL-MCNC: 0.98 MG/DL (ref 0.57–1)
GFR SERPL CREATININE-BSD FRML MDRD: 56 ML/MIN/1.73
GLOBULIN UR ELPH-MCNC: 2.6 GM/DL
GLUCOSE SERPL-MCNC: 82 MG/DL (ref 65–99)
HDLC SERPL-MCNC: 72 MG/DL (ref 40–60)
LDLC SERPL CALC-MCNC: 113 MG/DL (ref 0–100)
LDLC/HDLC SERPL: 1.54 {RATIO}
POTASSIUM SERPL-SCNC: 4.7 MMOL/L (ref 3.5–5.2)
PROT SERPL-MCNC: 7.4 G/DL (ref 6–8.5)
SODIUM SERPL-SCNC: 142 MMOL/L (ref 136–145)
T4 FREE SERPL-MCNC: 1.21 NG/DL (ref 0.93–1.7)
TRIGL SERPL-MCNC: 97 MG/DL (ref 0–150)
TSH SERPL DL<=0.05 MIU/L-ACNC: 3.87 UIU/ML (ref 0.27–4.2)
VLDLC SERPL-MCNC: 17 MG/DL (ref 5–40)

## 2022-08-09 ENCOUNTER — TRANSCRIBE ORDERS (OUTPATIENT)
Dept: ADMINISTRATIVE | Facility: HOSPITAL | Age: 72
End: 2022-08-09

## 2022-08-09 DIAGNOSIS — Z12.31 VISIT FOR SCREENING MAMMOGRAM: Primary | ICD-10-CM

## 2022-09-27 ENCOUNTER — HOSPITAL ENCOUNTER (OUTPATIENT)
Dept: MAMMOGRAPHY | Facility: HOSPITAL | Age: 72
Discharge: HOME | End: 2022-09-27
Admitting: INTERNAL MEDICINE

## 2022-09-27 DIAGNOSIS — Z12.31 VISIT FOR SCREENING MAMMOGRAM: ICD-10-CM

## 2022-09-27 PROCEDURE — 77067 SCR MAMMO BI INCL CAD: CPT

## 2022-09-27 PROCEDURE — 77063 BREAST TOMOSYNTHESIS BI: CPT | Performed by: RADIOLOGY

## 2022-09-27 PROCEDURE — 77067 SCR MAMMO BI INCL CAD: CPT | Performed by: RADIOLOGY

## 2022-09-27 PROCEDURE — 77063 BREAST TOMOSYNTHESIS BI: CPT

## 2022-11-14 ENCOUNTER — OFFICE VISIT (OUTPATIENT)
Dept: INTERNAL MEDICINE | Facility: CLINIC | Age: 72
End: 2022-11-14

## 2022-11-14 ENCOUNTER — LAB (OUTPATIENT)
Dept: LAB | Facility: HOSPITAL | Age: 72
End: 2022-11-14

## 2022-11-14 VITALS
SYSTOLIC BLOOD PRESSURE: 120 MMHG | WEIGHT: 159.8 LBS | DIASTOLIC BLOOD PRESSURE: 82 MMHG | BODY MASS INDEX: 31.21 KG/M2 | TEMPERATURE: 98.2 F | OXYGEN SATURATION: 94 % | HEART RATE: 62 BPM

## 2022-11-14 DIAGNOSIS — M18.12 ARTHRITIS OF CARPOMETACARPAL (CMC) JOINT OF LEFT THUMB: ICD-10-CM

## 2022-11-14 DIAGNOSIS — Z78.0 POSTMENOPAUSAL: ICD-10-CM

## 2022-11-14 DIAGNOSIS — Z83.3 FAMILY HISTORY OF DIABETES MELLITUS: ICD-10-CM

## 2022-11-14 DIAGNOSIS — E03.8 OTHER SPECIFIED HYPOTHYROIDISM: ICD-10-CM

## 2022-11-14 DIAGNOSIS — E78.5 DYSLIPIDEMIA: ICD-10-CM

## 2022-11-14 DIAGNOSIS — E55.9 VITAMIN D INSUFFICIENCY: ICD-10-CM

## 2022-11-14 DIAGNOSIS — Z86.2 HISTORY OF ANEMIA: ICD-10-CM

## 2022-11-14 DIAGNOSIS — Z00.00 MEDICARE ANNUAL WELLNESS VISIT, SUBSEQUENT: Primary | ICD-10-CM

## 2022-11-14 LAB
25(OH)D3 SERPL-MCNC: 62.5 NG/ML (ref 30–100)
ALBUMIN SERPL-MCNC: 4.5 G/DL (ref 3.5–5.2)
ALBUMIN/GLOB SERPL: 1.7 G/DL
ALP SERPL-CCNC: 67 U/L (ref 39–117)
ALT SERPL W P-5'-P-CCNC: 14 U/L (ref 1–33)
ANION GAP SERPL CALCULATED.3IONS-SCNC: 9.4 MMOL/L (ref 5–15)
AST SERPL-CCNC: 22 U/L (ref 1–32)
BILIRUB SERPL-MCNC: 0.3 MG/DL (ref 0–1.2)
BUN SERPL-MCNC: 16 MG/DL (ref 8–23)
BUN/CREAT SERPL: 16.3 (ref 7–25)
CALCIUM SPEC-SCNC: 9.9 MG/DL (ref 8.6–10.5)
CHLORIDE SERPL-SCNC: 107 MMOL/L (ref 98–107)
CHOLEST SERPL-MCNC: 201 MG/DL (ref 0–200)
CO2 SERPL-SCNC: 27.6 MMOL/L (ref 22–29)
CREAT SERPL-MCNC: 0.98 MG/DL (ref 0.57–1)
DEPRECATED RDW RBC AUTO: 40.1 FL (ref 37–54)
EGFRCR SERPLBLD CKD-EPI 2021: 61.8 ML/MIN/1.73
ERYTHROCYTE [DISTWIDTH] IN BLOOD BY AUTOMATED COUNT: 12.5 % (ref 12.3–15.4)
GLOBULIN UR ELPH-MCNC: 2.7 GM/DL
GLUCOSE SERPL-MCNC: 90 MG/DL (ref 65–99)
HBA1C MFR BLD: 5.6 % (ref 4.8–5.6)
HCT VFR BLD AUTO: 42.5 % (ref 34–46.6)
HDLC SERPL-MCNC: 65 MG/DL (ref 40–60)
HGB BLD-MCNC: 14.2 G/DL (ref 12–15.9)
LDLC SERPL CALC-MCNC: 111 MG/DL (ref 0–100)
LDLC/HDLC SERPL: 1.64 {RATIO}
MCH RBC QN AUTO: 29.6 PG (ref 26.6–33)
MCHC RBC AUTO-ENTMCNC: 33.4 G/DL (ref 31.5–35.7)
MCV RBC AUTO: 88.5 FL (ref 79–97)
PLATELET # BLD AUTO: 193 10*3/MM3 (ref 140–450)
PMV BLD AUTO: 12.6 FL (ref 6–12)
POTASSIUM SERPL-SCNC: 4.7 MMOL/L (ref 3.5–5.2)
PROT SERPL-MCNC: 7.2 G/DL (ref 6–8.5)
RBC # BLD AUTO: 4.8 10*6/MM3 (ref 3.77–5.28)
SODIUM SERPL-SCNC: 144 MMOL/L (ref 136–145)
T4 FREE SERPL-MCNC: 1.27 NG/DL (ref 0.93–1.7)
TRIGL SERPL-MCNC: 146 MG/DL (ref 0–150)
TSH SERPL DL<=0.05 MIU/L-ACNC: 4.93 UIU/ML (ref 0.27–4.2)
VIT B12 BLD-MCNC: 1085 PG/ML (ref 211–946)
VLDLC SERPL-MCNC: 25 MG/DL (ref 5–40)
WBC NRBC COR # BLD: 6.03 10*3/MM3 (ref 3.4–10.8)

## 2022-11-14 PROCEDURE — 80061 LIPID PANEL: CPT

## 2022-11-14 PROCEDURE — 82306 VITAMIN D 25 HYDROXY: CPT

## 2022-11-14 PROCEDURE — 36415 COLL VENOUS BLD VENIPUNCTURE: CPT

## 2022-11-14 PROCEDURE — 85027 COMPLETE CBC AUTOMATED: CPT

## 2022-11-14 PROCEDURE — 1170F FXNL STATUS ASSESSED: CPT | Performed by: INTERNAL MEDICINE

## 2022-11-14 PROCEDURE — G0444 DEPRESSION SCREEN ANNUAL: HCPCS | Performed by: INTERNAL MEDICINE

## 2022-11-14 PROCEDURE — 82607 VITAMIN B-12: CPT

## 2022-11-14 PROCEDURE — 99214 OFFICE O/P EST MOD 30 MIN: CPT | Performed by: INTERNAL MEDICINE

## 2022-11-14 PROCEDURE — 84439 ASSAY OF FREE THYROXINE: CPT

## 2022-11-14 PROCEDURE — 83036 HEMOGLOBIN GLYCOSYLATED A1C: CPT

## 2022-11-14 PROCEDURE — 80053 COMPREHEN METABOLIC PANEL: CPT

## 2022-11-14 PROCEDURE — 1159F MED LIST DOCD IN RCRD: CPT | Performed by: INTERNAL MEDICINE

## 2022-11-14 PROCEDURE — G0439 PPPS, SUBSEQ VISIT: HCPCS | Performed by: INTERNAL MEDICINE

## 2022-11-14 PROCEDURE — 84443 ASSAY THYROID STIM HORMONE: CPT

## 2022-11-14 RX ORDER — CETIRIZINE HYDROCHLORIDE 10 MG/1
TABLET ORAL
COMMUNITY
Start: 2021-08-01

## 2022-11-14 NOTE — PROGRESS NOTES
The ABCs of the Annual Wellness Visit  Subsequent Medicare Wellness Visit    Chief Complaint   Patient presents with   • Medicare Wellness-subsequent      Subjective    History of Present Illness:  Kathleen Goetz is a 72 y.o. female who presents for a Subsequent Medicare Wellness Visit.    Currently, she complains of arthritis of the bilateral thumbs, bilateral toes, and occasionally the bilateral hips and knees. For arthritis of the bilateral thumbs, she has been using voltaren gel, Aleve, turmeric, and a custom thumb splint for the right thumb, which offers some relief after a few days. In the past, she did warm wax baths for thumb pain. She feels a custom thumb splint would be beneficial for the left thumb as well. Declines referral to a hand specialist at this time.    She does mention occasionally sluggish bowels.    Not currently on any prescription medications aside from voltaren gel as needed for arthritic pain, mupirocin for ingrown toenails, and Zyrtec for allergies. One uncle with a history of diabetes. Personal history of anemia in childhood. Denies shortness of breath.    Up to date on all vaccinations. The last mammogram was completed in 09/2022, the last Pap-smear was in 2019, which was negative, and the last DEXA scan was completed in 2019, which revealed mild osteopenia of the spine and moderate osteopenia of the hip.    The following portions of the patient's history were reviewed and   updated as appropriate: allergies, current medications, past family history, past medical history, past social history, past surgical history and problem list.    Compared to one year ago, the patient feels her physical   health is worse.    Compared to one year ago, the patient feels her mental   health is better.    Recent Hospitalizations:  She was not admitted to the hospital during the last year.       Current Medical Providers:  Patient Care Team:  Rachele Paulino MD as PCP - General  Rachele Paulino,  "MD as PCP - Family Medicine    Outpatient Medications Prior to Visit   Medication Sig Dispense Refill   • Calcium Carbonate 1500 (600 Ca) MG tablet Take 1 tablet by mouth 2 (Two) Times a Day With Meals.     • cetirizine (ZyrTEC Allergy) 10 MG tablet Take one po daily     • Misc Natural Products (OSTEO BI-FLEX TRIPLE STRENGTH) tablet Take 1 tablet by mouth 2 (two) times a day.     • Multiple Vitamin (DAILY VITAMIN) tablet Take 1 tablet by mouth daily.     • mupirocin (BACTROBAN) 2 % ointment      • naproxen sodium (ALEVE) 220 MG tablet Take 1 tablet by mouth 2 (Two) Times a Day.     • TURMERIC PO Take 1 capsule by mouth Daily.       No facility-administered medications prior to visit.       No opioid medication identified on active medication list. I have reviewed chart for other potential  high risk medication/s and harmful drug interactions in the elderly.          Aspirin is not on active medication list.  Aspirin use is not indicated based on review of current medical condition/s. Risk of harm outweighs potential benefits.  .    Patient Active Problem List   Diagnosis   • Arthritis   • Thumb pain   • Annual physical exam     Advance Care Planning  Advance Directive is not on file.  ACP discussion was held with the patient during this visit. Patient has an advance directive (not in EMR), copy requested.          Objective    Vitals:    11/14/22 0756   BP: 120/82   Pulse: 62   Temp: 98.2 °F (36.8 °C)   SpO2: 94%   Weight: 72.5 kg (159 lb 12.8 oz)   PainSc: 0-No pain     Estimated body mass index is 31.21 kg/m² as calculated from the following:    Height as of 11/8/21: 152.4 cm (60\").    Weight as of this encounter: 72.5 kg (159 lb 12.8 oz).    BMI is >= 30 and <35. (Class 1 Obesity). The following options were offered after discussion;: exercise counseling/recommendations      Does the patient have evidence of cognitive impairment? No    Physical Exam  Constitutional:       General: She is not in acute distress.    "  Appearance: Normal appearance.   HENT:      Head: Normocephalic and atraumatic.      Right Ear: Tympanic membrane and external ear normal.      Left Ear: Tympanic membrane and external ear normal.      Nose: Nose normal.      Mouth/Throat:      Mouth: Mucous membranes are moist.      Comments: Postnasal drainage present.  Eyes:      General: No scleral icterus.  Neck:      Vascular: No carotid bruit.   Cardiovascular:      Rate and Rhythm: Normal rate and regular rhythm.      Pulses: Normal pulses.      Heart sounds: Normal heart sounds. No murmur heard.    No friction rub. No gallop.   Pulmonary:      Effort: Pulmonary effort is normal.      Breath sounds: Normal breath sounds. No rhonchi or rales.   Abdominal:      General: Bowel sounds are normal. There is no distension.      Palpations: Abdomen is soft.      Tenderness: There is no right CVA tenderness, left CVA tenderness, guarding or rebound.      Hernia: No hernia is present.   Musculoskeletal:         General: Normal range of motion.      Right hand: Tenderness present.      Left hand: Tenderness present.      Cervical back: Normal range of motion.      Right lower leg: No edema.      Left lower leg: No edema.   Lymphadenopathy:      Cervical: No cervical adenopathy.   Skin:     General: Skin is warm.      Findings: No rash.   Neurological:      General: No focal deficit present.      Mental Status: She is alert and oriented to person, place, and time. Mental status is at baseline.      Cranial Nerves: No cranial nerve deficit.      Sensory: No sensory deficit.      Coordination: Coordination normal.      Gait: Gait normal.      Deep Tendon Reflexes: Reflexes normal.   Psychiatric:         Mood and Affect: Mood normal.         Behavior: Behavior normal.                 HEALTH RISK ASSESSMENT    Smoking Status:  Social History     Tobacco Use   Smoking Status Never   Smokeless Tobacco Never   Tobacco Comments    My  smoked a pipe during the first 4  months of our marriage in 1977     Alcohol Consumption:  Social History     Substance and Sexual Activity   Alcohol Use Not Currently   • Alcohol/week: 0.0 standard drinks    Comment: None for at least a year and a half; formerly 6-9 glasses/yr     Fall Risk Screen:    RALF Fall Risk Assessment was completed, and patient is at LOW risk for falls.Assessment completed on:11/14/2022    Depression Screening:  PHQ-2/PHQ-9 Depression Screening 11/14/2022   Retired PHQ-9 Total Score -   Retired Total Score -   Little Interest or Pleasure in Doing Things 0-->not at all   Feeling Down, Depressed or Hopeless 0-->not at all   PHQ-9: Brief Depression Severity Measure Score 0       Health Habits and Functional and Cognitive Screening:  Functional & Cognitive Status 11/14/2022   Do you have difficulty preparing food and eating? No   Do you have difficulty bathing yourself, getting dressed or grooming yourself? No   Do you have difficulty using the toilet? No   Do you have difficulty moving around from place to place? No   Do you have trouble with steps or getting out of a bed or a chair? No   Current Diet Well Balanced Diet   Dental Exam Up to date   Eye Exam Up to date   Exercise (times per week) 4 times per week   Current Exercises Include Walking   Current Exercise Activities Include -   Do you need help using the phone?  No   Are you deaf or do you have serious difficulty hearing?  No   Do you need help with transportation? No   Do you need help shopping? No   Do you need help preparing meals?  No   Do you need help with housework?  No   Do you need help with laundry? No   Do you need help taking your medications? No   Do you need help managing money? No   Do you ever drive or ride in a car without wearing a seat belt? No   Have you felt unusual stress, anger or loneliness in the last month? No   Who do you live with? Alone   If you need help, do you have trouble finding someone available to you? No   Have you been bothered  in the last four weeks by sexual problems? No   Do you have difficulty concentrating, remembering or making decisions? Yes       Age-appropriate Screening Schedule:  Refer to the list below for future screening recommendations based on patient's age, sex and/or medical conditions. Orders for these recommended tests are listed in the plan section. The patient has been provided with a written plan.    Health Maintenance   Topic Date Due   • PAP SMEAR  06/18/2022   • DXA SCAN  08/07/2022   • LIPID PANEL  11/08/2022   • MAMMOGRAM  09/27/2024   • TDAP/TD VACCINES (2 - Td or Tdap) 05/09/2027   • INFLUENZA VACCINE  Completed   • ZOSTER VACCINE  Completed              Assessment & Plan   CMS Preventative Services Quick Reference  Risk Factors Identified During Encounter  Cardiovascular Disease  Drug Use/Abuse Identified or Suspected  Immunizations Discussed/Encouraged (specific Immunizations; utd  Obesity/Overweight   The above risks/problems have been discussed with the patient.  Follow up actions/plans if indicated are seen below in the Assessment/Plan Section.  Pertinent information has been shared with the patient in the After Visit Summary.        Current Outpatient Medications:   •  Calcium Carbonate 1500 (600 Ca) MG tablet, Take 1 tablet by mouth 2 (Two) Times a Day With Meals., Disp: , Rfl:   •  cetirizine (ZyrTEC Allergy) 10 MG tablet, Take one po daily, Disp: , Rfl:   •  Misc Natural Products (OSTEO BI-FLEX TRIPLE STRENGTH) tablet, Take 1 tablet by mouth 2 (two) times a day., Disp: , Rfl:   •  Multiple Vitamin (DAILY VITAMIN) tablet, Take 1 tablet by mouth daily., Disp: , Rfl:   •  mupirocin (BACTROBAN) 2 % ointment, , Disp: , Rfl:   •  naproxen sodium (ALEVE) 220 MG tablet, Take 1 tablet by mouth 2 (Two) Times a Day., Disp: , Rfl:   •  TURMERIC PO, Take 1 capsule by mouth Daily., Disp: , Rfl:   •  Diclofenac Sodium (VOLTAREN) 1 % gel gel, Apply 4 g topically to the appropriate area as directed 3 (Three) Times a  Day., Disp: , Rfl:       The following portions of the patient's history were reviewed and updated as appropriate: allergies, current medications, past family history, past medical history, past social history, past surgical history and problem list.        Objective   /82   Pulse 62   Temp 98.2 °F (36.8 °C)   Wt 72.5 kg (159 lb 12.8 oz)   SpO2 94%   BMI 31.21 kg/m²         Results for orders placed or performed in visit on 11/08/21   CBC (No Diff)    Specimen: Blood   Result Value Ref Range    WBC 6.28 3.40 - 10.80 10*3/mm3    RBC 4.84 3.77 - 5.28 10*6/mm3    Hemoglobin 14.6 12.0 - 15.9 g/dL    Hematocrit 43.2 34.0 - 46.6 %    MCV 89.3 79.0 - 97.0 fL    MCH 30.2 26.6 - 33.0 pg    MCHC 33.8 31.5 - 35.7 g/dL    RDW 12.6 12.3 - 15.4 %    RDW-SD 41.3 37.0 - 54.0 fl    MPV 12.0 6.0 - 12.0 fL    Platelets 177 140 - 450 10*3/mm3   Comprehensive Metabolic Panel    Specimen: Blood   Result Value Ref Range    Glucose 82 65 - 99 mg/dL    BUN 18 8 - 23 mg/dL    Creatinine 0.98 0.57 - 1.00 mg/dL    Sodium 142 136 - 145 mmol/L    Potassium 4.7 3.5 - 5.2 mmol/L    Chloride 104 98 - 107 mmol/L    CO2 29.4 (H) 22.0 - 29.0 mmol/L    Calcium 10.2 8.6 - 10.5 mg/dL    Total Protein 7.4 6.0 - 8.5 g/dL    Albumin 4.80 3.50 - 5.20 g/dL    ALT (SGPT) 14 1 - 33 U/L    AST (SGOT) 19 1 - 32 U/L    Alkaline Phosphatase 69 39 - 117 U/L    Total Bilirubin 0.3 0.0 - 1.2 mg/dL    eGFR Non African Amer 56 (L) >60 mL/min/1.73    Globulin 2.6 gm/dL    A/G Ratio 1.8 g/dL    BUN/Creatinine Ratio 18.4 7.0 - 25.0    Anion Gap 8.6 5.0 - 15.0 mmol/L   Lipid Panel    Specimen: Blood   Result Value Ref Range    Total Cholesterol 202 (H) 0 - 200 mg/dL    Triglycerides 97 0 - 150 mg/dL    HDL Cholesterol 72 (H) 40 - 60 mg/dL    LDL Cholesterol  113 (H) 0 - 100 mg/dL    VLDL Cholesterol 17 5 - 40 mg/dL    LDL/HDL Ratio 1.54    TSH    Specimen: Blood   Result Value Ref Range    TSH 3.870 0.270 - 4.200 uIU/mL   T4, Free    Specimen: Blood   Result  Value Ref Range    Free T4 1.21 0.93 - 1.70 ng/dL   Vitamin D 25 Hydroxy    Specimen: Blood   Result Value Ref Range    25 Hydroxy, Vitamin D 62.4 30.0 - 100.0 ng/ml             Assessment & Plan   Diagnoses and all orders for this visit:    Medicare annual wellness visit, subsequent  -     Routine fasting labs ordered today. Will advise of results upon review.  -     Up to date on all vaccinations and mammogram.  -     Due for DEXA scan.  -     DEXA scan ordered today. Will advise of results upon review.    Arthritis of carpometacarpal (CMC) joint of left thumb  -     Ambulatory Referral to Physical Therapy Evaluate and treat (molded left thumb splint)  -     CBC (No Diff); Future  -     CBC ordered. Will advise of results upon review.  -     Diclofenac Sodium (VOLTAREN) 1 % gel gel; Apply 4 g topically to the appropriate area as directed 3 (Three) Times a Day.  -     Begin voltaren ge as needed for pain.  -     Continue Aleve in moderation as needed for pain.    Dyslipidemia  -     Comprehensive Metabolic Panel; Future  -     Lipid Panel; Future  -     CMP and lipid panel ordered today. Will advise of results upon review.    Other specified hypothyroidism  -     TSH Rfx On Abnormal To Free T4; Future  -     TSH ordered today. Will advise of results upon review.    Vitamin D insufficiency  -     Vitamin D,25-Hydroxy; Future  -     Vitamin B12; Future  -     Vitamin D and vitamin B12 ordered today. Will advise of results upon review.    Postmenopausal  -     DEXA Bone Density Axial; Future  -     DEXA scan ordered today. Will advise of results upon review.    Family history of diabetes mellitus  -     Hemoglobin A1c; Future  -     Hemoglobin A1c ordered today. Will advise of results upon review.    History of anemia  -     CBC (No Diff); Future        -     CBC ordered today. Will advise of results upon review.    Other orders  -     cetirizine (ZyrTEC Allergy) 10 MG tablet; Take one po daily  -     Continue  cetirizine as prescribed.                PHQ-2/PHQ-9 Depression screening reviewed with patient . Total time spent today for depression screening  with Kathleen Goetz  was 15 minutes in counseling, along with plans for any diagnositc work-up and treatment.    Advice and education were given regarding cardiovascular risk reduction, healthy dietary habits, Seatbelt and helmet use and self skin examination.          Electronically signed by:    Rachele Paulino MD   Follow Up:   Return in about 6 months (around 5/14/2023) for pap.     An After Visit Summary and PPPS were made available to the patient.              Transcribed from ambient dictation for Rachele Paulino MD by Elizabeth Carcamo.  11/14/22   12:28 EST    Patient or patient representative verbalized consent to the visit recording.

## 2022-12-12 ENCOUNTER — TELEPHONE (OUTPATIENT)
Dept: INTERNAL MEDICINE | Facility: CLINIC | Age: 72
End: 2022-12-12

## 2023-02-01 ENCOUNTER — HOSPITAL ENCOUNTER (OUTPATIENT)
Dept: BONE DENSITY | Facility: HOSPITAL | Age: 73
Discharge: HOME | End: 2023-02-01
Admitting: INTERNAL MEDICINE
Payer: MEDICARE

## 2023-02-01 DIAGNOSIS — Z78.0 POSTMENOPAUSAL: ICD-10-CM

## 2023-02-01 PROCEDURE — 77080 DXA BONE DENSITY AXIAL: CPT

## 2023-05-15 ENCOUNTER — OFFICE VISIT (OUTPATIENT)
Dept: INTERNAL MEDICINE | Facility: CLINIC | Age: 73
End: 2023-05-15
Payer: MEDICARE

## 2023-05-15 VITALS
HEIGHT: 60 IN | WEIGHT: 161.4 LBS | TEMPERATURE: 97.5 F | SYSTOLIC BLOOD PRESSURE: 136 MMHG | BODY MASS INDEX: 31.69 KG/M2 | DIASTOLIC BLOOD PRESSURE: 76 MMHG | OXYGEN SATURATION: 97 % | HEART RATE: 58 BPM

## 2023-05-15 DIAGNOSIS — Z12.4 SCREENING FOR CERVICAL CANCER: Primary | ICD-10-CM

## 2023-05-15 DIAGNOSIS — Z80.9 FAMILY HISTORY OF CANCER: ICD-10-CM

## 2023-05-15 DIAGNOSIS — N95.2 ATROPHIC VAGINITIS: ICD-10-CM

## 2023-05-15 PROBLEM — Z80.3 FAMILY HISTORY OF BREAST CANCER: Status: ACTIVE | Noted: 2023-05-15

## 2023-05-15 NOTE — PROGRESS NOTES
Gynecologic Exam    Subjective   Kathleen Goetz is a 72 y.o. female is here today for follow-up.    History of Present Illness     The patient presents today for a 6-month follow-up and a Pap smear.    She is doing well. She denies any concerns. Her blood pressure is borderline today. She ran up the stairs. Her repeat blood pressure is 136/80 mmHg.     She denies any discharge. She has recently noticed that she is beginning to have sexual feelings again. It has been several years since she had intercourse.     She does not do monthly breast exams. She is getting her mammograms regularly. She is not interested in having a colonoscopy. She used hormone cream when she first entered menopause, but it did not seem to help. She has a greater fear of hormones because her mother and sister both had breast cancer. Her sister was a 5-year survivor in 2016 when she  of lung cancer at 72-years-old. Her mother was not diagnosed until she was 83-years-old. She  of aplastic anemia. Her mother had a mastectomy, but she never had chemotherapy or radiation after that. She has not had genetic testing. Her sister did not smoke. She had 1 sibling committed suicide at age 35-year-old.          Current Outpatient Medications:   •  Calcium Carbonate 1500 (600 Ca) MG tablet, Take 1 tablet by mouth 2 (Two) Times a Day With Meals., Disp: , Rfl:   •  cetirizine (zyrTEC) 10 MG tablet, Take one po daily, Disp: , Rfl:   •  Misc Natural Products (OSTEO BI-FLEX TRIPLE STRENGTH) tablet, Take 1 tablet by mouth 2 (two) times a day., Disp: , Rfl:   •  Multiple Vitamin (DAILY VITAMIN) tablet, Take 1 tablet by mouth Daily., Disp: , Rfl:   •  naproxen sodium (ALEVE) 220 MG tablet, Take 1 tablet by mouth 2 (Two) Times a Day., Disp: , Rfl:   •  TURMERIC PO, Take 1 capsule by mouth Daily., Disp: , Rfl:       The following portions of the patient's history were reviewed and updated as appropriate: allergies, current medications, past family  "history, past medical history, past social history, past surgical history and problem list.    Review of Systems   Constitutional: Negative.  Negative for chills and fever.   HENT: Negative for ear discharge, ear pain, sinus pressure and sore throat.    Respiratory: Negative for cough, chest tightness and shortness of breath.    Cardiovascular: Negative for chest pain, palpitations and leg swelling.   Gastrointestinal: Negative for diarrhea, nausea and vomiting.   Musculoskeletal: Negative for arthralgias, back pain and myalgias.   Neurological: Negative for dizziness, syncope and headaches.   Psychiatric/Behavioral: Negative for confusion and sleep disturbance.       Objective   /76   Pulse 58   Temp 97.5 °F (36.4 °C)   Ht 152.4 cm (60\")   Wt 73.2 kg (161 lb 6.4 oz)   SpO2 97% Comment: ra  BMI 31.52 kg/m²   Physical Exam  Vitals and nursing note reviewed.   Constitutional:       Appearance: She is well-developed.   HENT:      Head: Normocephalic and atraumatic.      Mouth/Throat:      Pharynx: No oropharyngeal exudate.   Eyes:      Conjunctiva/sclera: Conjunctivae normal.      Pupils: Pupils are equal, round, and reactive to light.   Neck:      Thyroid: No thyromegaly.   Cardiovascular:      Rate and Rhythm: Normal rate and regular rhythm.      Pulses: Normal pulses.      Heart sounds: Normal heart sounds. No murmur heard.    No friction rub. No gallop.      Comments: Breast exam wnl  Pulmonary:      Effort: Pulmonary effort is normal.      Breath sounds: Normal breath sounds.   Abdominal:      General: Bowel sounds are normal. There is no distension.      Palpations: Abdomen is soft.      Tenderness: There is no abdominal tenderness.      Hernia: There is no hernia in the left inguinal area or right inguinal area.   Genitourinary:     General: Normal vulva.      Exam position: Lithotomy position.      Pubic Area: No rash.       Labia:         Right: No lesion.         Left: No lesion.       Urethra: No " prolapse, urethral pain or urethral swelling.      Vagina: No vaginal discharge or tenderness.      Cervix: No cervical motion tenderness, discharge or cervical bleeding.      Uterus: Normal. Not enlarged and not tender.       Adnexa: Right adnexa normal and left adnexa normal.      Rectum: No tenderness, external hemorrhoid or internal hemorrhoid.   Musculoskeletal:      Cervical back: Neck supple.   Lymphadenopathy:      Lower Body: No right inguinal adenopathy. No left inguinal adenopathy.   Skin:     General: Skin is warm and dry.   Neurological:      Mental Status: She is alert and oriented to person, place, and time.      Cranial Nerves: No cranial nerve deficit.   Psychiatric:         Judgment: Judgment normal.           Results for orders placed or performed during the hospital encounter of 11/23/22   Covid-19 + Flu A&B AG, Veritor [id number]    Specimen: Swab   Result Value Ref Range    COVID19 Not Detected     Influenza A Antigen TU Detected (A)     Influenza B Antigen TU Not Detected     Internal Control Passed     Lot Number [id number]     Expiration Date 05-    POC Rapid Strep A    Specimen: Swab   Result Value Ref Range    Rapid Strep A Screen Negative     Internal Control Passed     Lot Number [id number]     Expiration Date 12-              Assessment & Plan   Diagnoses and all orders for this visit:    Screening for cervical cancer  -     LIQUID-BASED PAP SMEAR WITH HPV GENOTYPING IF ASCUS ([facility]); Future  -     LIQUID-BASED PAP SMEAR WITH HPV GENOTYPING IF ASCUS ([facility])    Atrophic vaginitis  Comments:  moderate-discussed vaginal estrogen. hold off until needs. will do once weekly estrace    Family history of cancer  Comments:  advised genetic testing, she will think about it and let me know if wishes to proceed.      1. Vaginal dryness.  - Begin Estrace as prescribed.  - Advised to use KY jelly prior to sexual intercourse.    2. Family history of lung cancer.  - Will advise  of results upon review.           Return for Next scheduled follow up.    Electronically signed by:    Rachele Paulino MD     Transcribed from ambient dictation for Rachele Paulino MD by Franchesca Winter, Quality .  05/15/23   11:42 EDT    Patient or patient representative verbalized consent to the visit recording.  I have personally performed the services described in this document as transcribed by the above individual, and it is both accurate and complete.  Rachele Paulino MD  5/16/2023  22:27 EDT

## 2023-05-17 LAB — REF LAB TEST METHOD: NORMAL

## 2023-08-18 ENCOUNTER — TRANSCRIBE ORDERS (OUTPATIENT)
Dept: ADMINISTRATIVE | Facility: HOSPITAL | Age: 73
End: 2023-08-18
Payer: MEDICARE

## 2023-08-18 DIAGNOSIS — Z12.31 SCREENING MAMMOGRAM FOR BREAST CANCER: Primary | ICD-10-CM

## 2023-09-28 ENCOUNTER — HOSPITAL ENCOUNTER (OUTPATIENT)
Dept: MAMMOGRAPHY | Facility: HOSPITAL | Age: 73
Discharge: HOME | End: 2023-09-28
Admitting: INTERNAL MEDICINE
Payer: MEDICARE

## 2023-09-28 DIAGNOSIS — Z12.31 SCREENING MAMMOGRAM FOR BREAST CANCER: ICD-10-CM

## 2023-09-28 PROCEDURE — 77063 BREAST TOMOSYNTHESIS BI: CPT

## 2023-09-28 PROCEDURE — 77067 SCR MAMMO BI INCL CAD: CPT

## 2023-11-27 ENCOUNTER — OFFICE VISIT (OUTPATIENT)
Dept: INTERNAL MEDICINE | Facility: CLINIC | Age: 73
End: 2023-11-27
Payer: MEDICARE

## 2023-11-27 ENCOUNTER — LAB (OUTPATIENT)
Dept: LAB | Facility: HOSPITAL | Age: 73
End: 2023-11-27
Payer: MEDICARE

## 2023-11-27 VITALS
HEIGHT: 60 IN | WEIGHT: 160.8 LBS | TEMPERATURE: 97.3 F | SYSTOLIC BLOOD PRESSURE: 122 MMHG | HEART RATE: 64 BPM | OXYGEN SATURATION: 93 % | DIASTOLIC BLOOD PRESSURE: 70 MMHG | BODY MASS INDEX: 31.57 KG/M2

## 2023-11-27 DIAGNOSIS — E55.9 VITAMIN D INSUFFICIENCY: ICD-10-CM

## 2023-11-27 DIAGNOSIS — E03.8 OTHER SPECIFIED HYPOTHYROIDISM: ICD-10-CM

## 2023-11-27 DIAGNOSIS — E78.5 DYSLIPIDEMIA: ICD-10-CM

## 2023-11-27 DIAGNOSIS — Z00.00 MEDICARE ANNUAL WELLNESS VISIT, SUBSEQUENT: Primary | ICD-10-CM

## 2023-11-27 DIAGNOSIS — M62.89 PELVIC FLOOR DYSFUNCTION: ICD-10-CM

## 2023-11-27 DIAGNOSIS — R87.610 ASCUS OF CERVIX WITH NEGATIVE HIGH RISK HPV: ICD-10-CM

## 2023-11-27 DIAGNOSIS — Z83.3 FAMILY HISTORY OF DIABETES MELLITUS: ICD-10-CM

## 2023-11-27 LAB
25(OH)D3 SERPL-MCNC: 83.3 NG/ML (ref 30–100)
ALBUMIN SERPL-MCNC: 5 G/DL (ref 3.5–5.2)
ALBUMIN/GLOB SERPL: 2.4 G/DL
ALP SERPL-CCNC: 67 U/L (ref 39–117)
ALT SERPL W P-5'-P-CCNC: 14 U/L (ref 1–33)
ANION GAP SERPL CALCULATED.3IONS-SCNC: 11 MMOL/L (ref 5–15)
AST SERPL-CCNC: 20 U/L (ref 1–32)
BILIRUB SERPL-MCNC: 0.3 MG/DL (ref 0–1.2)
BUN SERPL-MCNC: 16 MG/DL (ref 8–23)
BUN/CREAT SERPL: 15 (ref 7–25)
CALCIUM SPEC-SCNC: 10.3 MG/DL (ref 8.6–10.5)
CHLORIDE SERPL-SCNC: 100 MMOL/L (ref 98–107)
CHOLEST SERPL-MCNC: 207 MG/DL (ref 0–200)
CO2 SERPL-SCNC: 29 MMOL/L (ref 22–29)
CREAT SERPL-MCNC: 1.07 MG/DL (ref 0.57–1)
DEPRECATED RDW RBC AUTO: 40.1 FL (ref 37–54)
EGFRCR SERPLBLD CKD-EPI 2021: 55.3 ML/MIN/1.73
ERYTHROCYTE [DISTWIDTH] IN BLOOD BY AUTOMATED COUNT: 12.4 % (ref 12.3–15.4)
GLOBULIN UR ELPH-MCNC: 2.1 GM/DL
GLUCOSE SERPL-MCNC: 90 MG/DL (ref 65–99)
HBA1C MFR BLD: 5.6 % (ref 4.8–5.6)
HCT VFR BLD AUTO: 41.8 % (ref 34–46.6)
HDLC SERPL-MCNC: 70 MG/DL (ref 40–60)
HGB BLD-MCNC: 14.3 G/DL (ref 12–15.9)
LDLC SERPL CALC-MCNC: 115 MG/DL (ref 0–100)
LDLC/HDLC SERPL: 1.59 {RATIO}
MCH RBC QN AUTO: 30.1 PG (ref 26.6–33)
MCHC RBC AUTO-ENTMCNC: 34.2 G/DL (ref 31.5–35.7)
MCV RBC AUTO: 88 FL (ref 79–97)
PLATELET # BLD AUTO: 211 10*3/MM3 (ref 140–450)
PMV BLD AUTO: 11.9 FL (ref 6–12)
POTASSIUM SERPL-SCNC: 4.6 MMOL/L (ref 3.5–5.2)
PROT SERPL-MCNC: 7.1 G/DL (ref 6–8.5)
RBC # BLD AUTO: 4.75 10*6/MM3 (ref 3.77–5.28)
SODIUM SERPL-SCNC: 140 MMOL/L (ref 136–145)
T4 FREE SERPL-MCNC: 1.1 NG/DL (ref 0.93–1.7)
TRIGL SERPL-MCNC: 128 MG/DL (ref 0–150)
TSH SERPL DL<=0.05 MIU/L-ACNC: 5.87 UIU/ML (ref 0.27–4.2)
VIT B12 BLD-MCNC: 1027 PG/ML (ref 211–946)
VLDLC SERPL-MCNC: 22 MG/DL (ref 5–40)
WBC NRBC COR # BLD AUTO: 6.84 10*3/MM3 (ref 3.4–10.8)

## 2023-11-27 PROCEDURE — 80053 COMPREHEN METABOLIC PANEL: CPT

## 2023-11-27 PROCEDURE — 82607 VITAMIN B-12: CPT

## 2023-11-27 PROCEDURE — 83036 HEMOGLOBIN GLYCOSYLATED A1C: CPT

## 2023-11-27 PROCEDURE — 80061 LIPID PANEL: CPT

## 2023-11-27 PROCEDURE — 85027 COMPLETE CBC AUTOMATED: CPT

## 2023-11-27 PROCEDURE — 82306 VITAMIN D 25 HYDROXY: CPT

## 2023-11-27 PROCEDURE — 84443 ASSAY THYROID STIM HORMONE: CPT

## 2023-11-27 PROCEDURE — 84439 ASSAY OF FREE THYROXINE: CPT

## 2023-11-27 NOTE — PROGRESS NOTES
The ABCs of the Annual Wellness Visit  Subsequent Medicare Wellness Visit    Subjective    Kathleen Goetz is a 73 y.o. female who presents for a Subsequent Medicare Wellness Visit.    The following portions of the patient's history were reviewed and   updated as appropriate: allergies, current medications, past family history, past medical history, past social history, past surgical history, and problem list.    Compared to one year ago, the patient feels her physical   health is the same.    Compared to one year ago, the patient feels her mental   health is the same.    Recent Hospitalizations:  She was not admitted to the hospital during the last year.       Current Medical Providers:  Patient Care Team:  Rachele Paulino MD as PCP - General  Rachele Paulino MD as PCP - Family Medicine  Flora Cullen MD as Consulting Physician (Dermatopathology)    Outpatient Medications Prior to Visit   Medication Sig Dispense Refill    Calcium Carbonate 1500 (600 Ca) MG tablet Take 1 tablet by mouth 2 (Two) Times a Day With Meals.      cetirizine (zyrTEC) 10 MG tablet Take one po daily      Misc Natural Products (OSTEO BI-FLEX TRIPLE STRENGTH) tablet Take 1 tablet by mouth 2 (two) times a day.      Multiple Vitamin (DAILY VITAMIN) tablet Take 1 tablet by mouth Daily.      naproxen sodium (ALEVE) 220 MG tablet Take 1 tablet by mouth 2 (Two) Times a Day.      TURMERIC PO Take 1 capsule by mouth Daily.       No facility-administered medications prior to visit.       No opioid medication identified on active medication list. I have reviewed chart for other potential  high risk medication/s and harmful drug interactions in the elderly.        Aspirin is not on active medication list.  Aspirin use is not indicated based on review of current medical condition/s. Risk of harm outweighs potential benefits.  .    Patient Active Problem List   Diagnosis    Arthritis    Thumb pain    Annual physical exam    Family history of  "breast cancer    Family history of cancer     Advance Care Planning   Advance Care Planning     Advance Directive is on file.  ACP discussion was held with the patient during this visit. Patient has an advance directive in EMR which is still valid.      Objective    Vitals:    23 0832   BP: 122/70   Pulse: 64   Temp: 97.3 °F (36.3 °C)   SpO2: 93%  Comment: ra   Weight: 72.9 kg (160 lb 12.8 oz)   Height: 152.4 cm (60\")   PainSc: 0-No pain     Estimated body mass index is 31.4 kg/m² as calculated from the following:    Height as of this encounter: 152.4 cm (60\").    Weight as of this encounter: 72.9 kg (160 lb 12.8 oz).    BMI is >= 30 and <35. (Class 1 Obesity). The following options were offered after discussion;: exercise counseling/recommendations and nutrition counseling/recommendations      Does the patient have evidence of cognitive impairment? No    Lab Results   Component Value Date    TRIG 128 2023    HDL 70 (H) 2023     (H) 2023    VLDL 22 2023    HGBA1C 5.60 2023        HEALTH RISK ASSESSMENT    Smoking Status:  Social History     Tobacco Use   Smoking Status Never   Smokeless Tobacco Never   Tobacco Comments    My  smoked a pipe during the first 4 months of our marriage in      Alcohol Consumption:  Social History     Substance and Sexual Activity   Alcohol Use Not Currently    Comment: 1 glass of wine in 2022; formerly 6-9 glasses/yr     Fall Risk Screen:    FLOADI Fall Risk Assessment was completed, and patient is at HIGH risk for falls. Assessment completed on:2023    Depression Screenin/27/2023     8:44 AM   PHQ-2/PHQ-9 Depression Screening   Little Interest or Pleasure in Doing Things 0-->not at all   Feeling Down, Depressed or Hopeless 0-->not at all   PHQ-9: Brief Depression Severity Measure Score 0       Health Habits and Functional and Cognitive Screenin/27/2023     8:43 AM   Functional & Cognitive Status   Do " you have difficulty preparing food and eating? No   Do you have difficulty bathing yourself, getting dressed or grooming yourself? No   Do you have difficulty using the toilet? No   Do you have difficulty moving around from place to place? No   Do you have trouble with steps or getting out of a bed or a chair? No   Current Diet Well Balanced Diet   Dental Exam Up to date   Eye Exam Up to date   Exercise (times per week) 4 times per week   Current Exercises Include Walking   Do you need help using the phone?  No   Are you deaf or do you have serious difficulty hearing?  No   Do you need help to go to places out of walking distance? No   Do you need help shopping? No   Do you need help preparing meals?  No   Do you need help with housework?  No   Do you need help with laundry? No   Do you need help taking your medications? No   Do you need help managing money? No   Do you ever drive or ride in a car without wearing a seat belt? No   Have you felt unusual stress, anger or loneliness in the last month? No   Who do you live with? Alone   Have you been bothered in the last four weeks by sexual problems? No   Do you have difficulty concentrating, remembering or making decisions? No       Age-appropriate Screening Schedule:  Refer to the list below for future screening recommendations based on patient's age, sex and/or medical conditions. Orders for these recommended tests are listed in the plan section. The patient has been provided with a written plan.    Health Maintenance   Topic Date Due    ANNUAL WELLNESS VISIT  11/27/2024    LIPID PANEL  11/27/2024    BMI FOLLOWUP  11/27/2024    COLORECTAL CANCER SCREENING  12/12/2024    MAMMOGRAM  09/28/2025    DXA SCAN  02/01/2026    PAP SMEAR  05/15/2026    TDAP/TD VACCINES (2 - Td or Tdap) 05/09/2027    HEPATITIS C SCREENING  Completed    COVID-19 Vaccine  Completed    INFLUENZA VACCINE  Completed    Pneumococcal Vaccine 65+  Completed    ZOSTER VACCINE  Completed             "      CMS Preventative Services Quick Reference  Risk Factors Identified During Encounter  Fall Risk-High or Moderate: Discussed Fall Prevention in the home  Immunizations Discussed/Encouraged:  utd  The above risks/problems have been discussed with the patient.  Pertinent information has been shared with the patient in the After Visit Summary.  An After Visit Summary and PPPS were made available to the patient.    Follow Up:   Next Medicare Wellness visit to be scheduled in 1 year.       Additional E&M Note during same encounter follows:  Patient has multiple medical problems which are significant and separately identifiable that require additional work above and beyond the Medicare Wellness Visit.      Chief Complaint  Medicare Wellness-subsequent    Subjective        HPI  Kathleen Goetz is also being seen today for urge incontinence, notes she has been having in more, gets up 1-2 times nightly.  Of note- last Pap showed Ascus, needs a repeat today.    Review of Systems   Constitutional:  Negative for chills and fever.   HENT:  Negative for congestion, ear pain and sore throat.    Eyes:  Negative for pain, redness and visual disturbance.   Respiratory:  Negative for cough and shortness of breath.    Cardiovascular:  Negative for chest pain, palpitations and leg swelling.   Gastrointestinal:  Negative for abdominal pain, diarrhea and nausea.   Endocrine: Negative for cold intolerance and heat intolerance.   Genitourinary:  Positive for urgency. Negative for flank pain.   Musculoskeletal:  Negative for arthralgias and gait problem.   Skin:  Negative for pallor and rash.   Neurological:  Negative for dizziness and weakness.   Psychiatric/Behavioral:  Negative for dysphoric mood and sleep disturbance. The patient is not nervous/anxious.        Objective   Vital Signs:  /70   Pulse 64   Temp 97.3 °F (36.3 °C)   Ht 152.4 cm (60\")   Wt 72.9 kg (160 lb 12.8 oz)   SpO2 93% Comment: ra  BMI 31.40 kg/m² "     Physical Exam  Constitutional:       General: She is not in acute distress.     Appearance: Normal appearance.   HENT:      Head: Normocephalic and atraumatic.      Right Ear: Tympanic membrane and external ear normal.      Left Ear: Tympanic membrane and external ear normal.      Nose: Nose normal.      Mouth/Throat:      Mouth: Mucous membranes are moist.   Eyes:      General: No scleral icterus.  Neck:      Vascular: No carotid bruit.   Cardiovascular:      Rate and Rhythm: Normal rate and regular rhythm.      Pulses: Normal pulses.      Heart sounds: Normal heart sounds. No murmur heard.     No friction rub. No gallop.   Pulmonary:      Effort: Pulmonary effort is normal.      Breath sounds: Normal breath sounds. No rhonchi or rales.   Abdominal:      General: Bowel sounds are normal. There is no distension.      Palpations: Abdomen is soft.      Tenderness: There is no right CVA tenderness, left CVA tenderness, guarding or rebound.      Hernia: No hernia is present. There is no hernia in the left inguinal area or right inguinal area.   Genitourinary:     General: Normal vulva.      Exam position: Lithotomy position.      Pubic Area: No rash.       Labia:         Right: No lesion.         Left: No lesion.       Urethra: No prolapse, urethral pain or urethral swelling.      Vagina: No vaginal discharge or tenderness.      Cervix: No cervical motion tenderness, discharge or cervical bleeding.      Uterus: Normal. Not enlarged and not tender.       Adnexa: Right adnexa normal and left adnexa normal.      Rectum: No tenderness, external hemorrhoid or internal hemorrhoid.   Musculoskeletal:         General: No tenderness. Normal range of motion.      Cervical back: Normal range of motion.      Right lower leg: No edema.      Left lower leg: No edema.   Lymphadenopathy:      Cervical: No cervical adenopathy.      Lower Body: No right inguinal adenopathy. No left inguinal adenopathy.   Skin:     General: Skin is  "warm.      Findings: No rash.   Neurological:      General: No focal deficit present.      Mental Status: She is alert and oriented to person, place, and time. Mental status is at baseline.      Cranial Nerves: No cranial nerve deficit.      Sensory: No sensory deficit.      Coordination: Coordination normal.      Gait: Gait normal.      Deep Tendon Reflexes: Reflexes normal.   Psychiatric:         Mood and Affect: Mood normal.         Behavior: Behavior normal.                               Current Outpatient Medications:     Calcium Carbonate 1500 (600 Ca) MG tablet, Take 1 tablet by mouth 2 (Two) Times a Day With Meals., Disp: , Rfl:     cetirizine (zyrTEC) 10 MG tablet, Take one po daily, Disp: , Rfl:     Misc Natural Products (OSTEO BI-FLEX TRIPLE STRENGTH) tablet, Take 1 tablet by mouth 2 (two) times a day., Disp: , Rfl:     Multiple Vitamin (DAILY VITAMIN) tablet, Take 1 tablet by mouth Daily., Disp: , Rfl:     naproxen sodium (ALEVE) 220 MG tablet, Take 1 tablet by mouth 2 (Two) Times a Day., Disp: , Rfl:     TURMERIC PO, Take 1 capsule by mouth Daily., Disp: , Rfl:       The following portions of the patient's history were reviewed and updated as appropriate: allergies, current medications, past family history, past medical history, past social history, past surgical history and problem list.        Objective   /70   Pulse 64   Temp 97.3 °F (36.3 °C)   Ht 152.4 cm (60\")   Wt 72.9 kg (160 lb 12.8 oz)   SpO2 93% Comment: ra  BMI 31.40 kg/m²         Results for orders placed or performed in visit on 05/15/23   LIQUID-BASED PAP SMEAR WITH HPV GENOTYPING IF ASCUS (JORGE,COR,MAD)    Specimen: Cervix; ThinPrep Vial   Result Value Ref Range    Reference Lab Report       [facility]  [address] [place] [address]  Phone: [phone] or [phone]  Fax: [phone]  Kel Hewitt M.D., Medical Director    PATIENT NAME                                     LABORATORY " NO.  [id number]   KAMILAH SINGER.                            [id number]  [id number]                                 AGE                    SEX   SSN              CLIENT REF #  [facility]                     72        1950      F     [id number]-      [id number]    (GERHARD)                                 REQUESTING M.D.           ATTENDING M.D.         COPY TO.  [address] [place] [address]                           [place] [address]                        DATE COLLECTED            DATE RECEIVED          DATE REPORTED  05/15/2023                05/15/2023             2023    ThinPrep Pap with Cytyc Imaging    DIAGNOSIS:  Epithelial cell abnormality. (ASC) Atypical squamous cells of  undetermined  significance.    COMMENT:     Benign cellular changes associated with atrophy are present.    SPECIMEN ADEQUACY:  SATISFACTORY FOR EVALUATION  Transformation zone is present.  SOURCE OF SPECIMEN:       CERVICAL  SLIDES:  1  CLINICAL HISTORY:  Screening for cervical cancer    HPV  HR-HPV POOL: Negative    The Aptima HPV assay is an in vitro nucleic acid amplification test for the  qualitative detection of E6/E7 viral messenger RNA from 14 high risk types of  HPV in cervical specimens. The high risk HPV types detected include: 16, 18,  31, 33, 35, 39, 45, 51, 52, 56, 58, 59, 66, 68    CYTOTECHNOLOGIST:             Monica Capps D.O.,                            REVIEWED, DIAGNOSED AND  F.C.A.P.                                          ELECTRONICALLY SIGNED BY:      CPT CODES:  91541, 86888, 66373               Assessment & Plan   Diagnoses and all orders for this visit:    Medicare annual wellness visit, subsequent    Pelvic floor dysfunction  -     Ambulatory Referral to Physical Therapy Pelvic Floor    ASCUS of cervix with negative high risk HPV  -     LIQUID-BASED PAP SMEAR WITH HPV GENOTYPING REGARDLESS OF INTERPRETATION (JORGE,COR,MAD); Future  -     LIQUID-BASED PAP SMEAR WITH HPV  GENOTYPING REGARDLESS OF INTERPRETATION (JORGE,COR,MAD)  -     CBC (No Diff); Future    Vitamin D insufficiency  -     Vitamin B12; Future  -     Vitamin D,25-Hydroxy; Future    Other specified hypothyroidism  -     TSH Rfx On Abnormal To Free T4; Future    Dyslipidemia  -     Comprehensive Metabolic Panel; Future  -     Lipid Panel; Future    Family history of diabetes mellitus  -     Hemoglobin A1c; Future               PHQ-2/PHQ-9 Depression screening reviewed with patient . Total time spent today for depression screening  with Kathleen Goetz  was 15 minutes in counseling, along with plans for any diagnositc work-up and treatment.    Advice and education were given regarding cardiovascular risk reduction, healthy dietary habits, Seatbelt and helmet use and self skin examination.          Electronically signed by:    Rachele Paulino MD           Follow Up   Return in about 1 year (around 11/27/2024) for Medicare Wellness.  Patient was given instructions and counseling regarding her condition or for health maintenance advice. Please see specific information pulled into the AVS if appropriate.

## 2023-12-01 LAB
CYTOLOGIST CVX/VAG CYTO: NORMAL
CYTOLOGY CVX/VAG DOC CYTO: NORMAL
CYTOLOGY CVX/VAG DOC THIN PREP: NORMAL
DX ICD CODE: NORMAL
HIV 1 & 2 AB SER-IMP: NORMAL
HPV GENOTYPE REFLEX: NORMAL
HPV I/H RISK 4 DNA CVX QL PROBE+SIG AMP: NEGATIVE
Lab: NORMAL
OTHER STN SPEC: NORMAL
STAT OF ADQ CVX/VAG CYTO-IMP: NORMAL

## 2023-12-08 ENCOUNTER — TELEPHONE (OUTPATIENT)
Dept: INTERNAL MEDICINE | Facility: CLINIC | Age: 73
End: 2023-12-08
Payer: MEDICARE

## 2023-12-08 NOTE — TELEPHONE ENCOUNTER
Caller: Kathleen Goetz    Relationship to patient: Self    Best call back number:  [phone]    Date of exposure: NOT ADRIENNE. SYMPTOMS STARTED ON 12/1/23.    Date of positive COVID19 test: 12/4/23      Additional information or concerns: ONLY NEEDS TO KNOW IF CAN GO BACK TO WORK ON MONDAY, 12/11/23. PLEASE ADVISE ASAP.  THANK YOU.

## 2024-02-07 ENCOUNTER — TREATMENT (OUTPATIENT)
Dept: PHYSICAL THERAPY | Facility: CLINIC | Age: 74
End: 2024-02-07
Payer: MEDICARE

## 2024-02-07 DIAGNOSIS — M62.89 PELVIC FLOOR DYSFUNCTION: Primary | ICD-10-CM

## 2024-02-07 DIAGNOSIS — R39.15 URINARY URGENCY: ICD-10-CM
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
## 2024-02-16 ENCOUNTER — OFFICE VISIT (OUTPATIENT)
Dept: INTERNAL MEDICINE | Facility: CLINIC | Age: 74
End: 2024-02-16
Payer: MEDICARE

## 2024-02-16 VITALS
TEMPERATURE: 97.3 F | WEIGHT: 162 LBS | OXYGEN SATURATION: 98 % | BODY MASS INDEX: 31.8 KG/M2 | DIASTOLIC BLOOD PRESSURE: 80 MMHG | SYSTOLIC BLOOD PRESSURE: 144 MMHG | HEIGHT: 60 IN | HEART RATE: 63 BPM

## 2024-02-16 DIAGNOSIS — N64.9 BREAST LESION: Primary | ICD-10-CM

## 2024-02-16 DIAGNOSIS — B35.4 TINEA CORPORIS: ICD-10-CM

## 2024-02-16 RX ORDER — CLOTRIMAZOLE AND BETAMETHASONE DIPROPIONATE 10; .64 MG/G; MG/G
1 CREAM TOPICAL 2 TIMES DAILY
Qty: 45 G | Refills: 0 | Status: SHIPPED | OUTPATIENT
Start: 2024-02-16

## 2024-02-21 ENCOUNTER — TREATMENT (OUTPATIENT)
Dept: PHYSICAL THERAPY | Facility: CLINIC | Age: 74
End: 2024-02-21
Payer: MEDICARE

## 2024-02-21 DIAGNOSIS — R39.15 URINARY URGENCY: ICD-10-CM

## 2024-02-21 DIAGNOSIS — M62.89 PELVIC FLOOR DYSFUNCTION: Primary | ICD-10-CM

## 2024-02-21 NOTE — PROGRESS NOTES
Physical Therapy Daily Treatment Note  [address] [facility], [address]; [place] [address]        Patient: Kathleen Goetz   : 1950  Referring practitioner: Rachele Paulino MD  Date of Initial Visit: Type: THERAPY  Noted: 2024  Today's Date: 2024  Patient seen for 2 sessions       Visit Diagnoses:    ICD-10-CM ICD-9-CM   1. Pelvic floor dysfunction  M62.89 618.83   2. Urinary urgency  R39.15 788.63       Subjective Greater awareness of true urgency, more in control.  Difficulty drinking water paced throughout the day;    At meals 10-20 oz of water   Limiting coffee intake.    Leakage 1-2x when on the way to toilet; after delaying urination.  Occurs at home.    Trying exercises      Objective   See Exercise, Manual, and Modality Logs for complete treatment.       Assessment/Plan Cont POC; progression of HEP, modify crunches with feet supported. Coordination of PFM and TrA increasing.       Timed:   926 1011     Manual Therapy:    10     mins  23135;     Therapeutic Exercise:    35     mins  54083;     Neuromuscular Allen:    0    mins  68053;    Therapeutic Activity:     0     mins  31884;     Gait Trainin     mins  73098;     Ultrasound:     0     mins  23530;    Ionto                               0    mins   52233  Self Care                       0     mins   80682  Canalith Repos    0     mins 44426      Un-Timed:  Electrical Stimulation:    0     mins  07793 ( );  Dry Needling     0     mins self-pay  Traction     0     mins 49045      Timed Treatment:   45   mins   Total Treatment:     45   mins    Monalisa Bailey, PT  [place] License: [id number]

## 2024-03-06 ENCOUNTER — TREATMENT (OUTPATIENT)
Dept: PHYSICAL THERAPY | Facility: CLINIC | Age: 74
End: 2024-03-06
Payer: MEDICARE

## 2024-03-06 DIAGNOSIS — M62.89 PELVIC FLOOR DYSFUNCTION: Primary | ICD-10-CM

## 2024-03-06 DIAGNOSIS — R39.15 URINARY URGENCY: ICD-10-CM

## 2024-03-06 NOTE — PROGRESS NOTES
"Physical Therapy Daily Treatment Note  [address] [facility], [address]; [place] [address]        Patient: Kathleen Goetz   : 1950  Referring practitioner: Rachele Paulino MD  Date of Initial Visit: Type: THERAPY  Noted: 2024  Today's Date: 3/6/2024  Patient seen for 3 sessions       Visit Diagnoses:    ICD-10-CM ICD-9-CM   1. Pelvic floor dysfunction  M62.89 618.83   2. Urinary urgency  R39.15 788.63       Subjective   Skipped exercises in the morning and then had urgency and leakage.  \" I will do my exercises in the morning if I am not dead.\"  All other days, waiting too long to urinate, then leaked moderate amount, change clothes.  More frequent in AM voiding, 3x in 4 hours, but then 3ish hours throughout day;    Variations of new form of exercises increasing to 20x forward, 20x each side oblique  Objective   See Exercise, Manual, and Modality Logs for complete treatment.       Assessment/Plan Cont POC; in one week, focus on delayed urination only until after 2nd urge. Pace water intake. Addition of hip ER strengthening exercises      Timed:  924  957     Manual Therapy:    0     mins  53997;     Therapeutic Exercise:    15     mins  08046;     Neuromuscular Allen:    0    mins  52887;    Therapeutic Activity:     18     mins  81696;     Gait Trainin     mins  18597;     Ultrasound:     0     mins  21473;    Ionto                               0    mins   86558  Self Care                       0     mins   13138  Canalith Repos    0     mins 58988      Un-Timed:  Electrical Stimulation:    0     mins  00357 ( );  Dry Needling     0     mins self-pay  Traction     0     mins 00279      Timed Treatment:   33   mins   Total Treatment:     33   mins    Monalisa Bailey, PT  [place] License: [id number]                  "

## 2024-03-13 ENCOUNTER — TREATMENT (OUTPATIENT)
Dept: PHYSICAL THERAPY | Facility: CLINIC | Age: 74
End: 2024-03-13
Payer: MEDICARE

## 2024-03-13 DIAGNOSIS — R39.15 URINARY URGENCY: ICD-10-CM

## 2024-03-13 DIAGNOSIS — M62.89 PELVIC FLOOR DYSFUNCTION: Primary | ICD-10-CM

## 2024-03-13 NOTE — PROGRESS NOTES
DISCHARGE  PHYSICAL THERAPY  PROGRESS NOTE  [address], [address]; [place] [address]          Patient: Kathleen Goetz   : 1950  Diagnosis/ICD-10 Code:  Pelvic floor dysfunction [M62.89]  Referring practitioner: Rachele Paulino MD  Date of Initial Visit: Type: THERAPY  Noted: 2024  Today's Date: 3/13/2024  Patient seen for 4 sessions      Subjective:   Kathleen Goetz reports: week with no leakage and no dribs. Then last night, one episode of leakage, after delayed urination and drank fluids.  Feeling confident  Subjective Questionnaire: PFDI-20: 8%  vs original 20%  Clinical Progress: improved  Home Program Compliance: Yes  Treatment has included: therapeutic exercise, manual therapy, and therapeutic activity    VISIT DIAGNOSIS    ICD-10-CM ICD-9-CM   1. Pelvic floor dysfunction  M62.89 618.83   2. Urinary urgency  R39.15 788.63       Subjective note that times of anxiety cause more urgency.  Objective   See Exercise, Manual, and Modality Logs for complete treatment.     Assessment/Plan  Progress toward previous goals: Partially Met More freedom with going out, decreased frequency of urination. Greater management of pelvic floor muscles; modified workout routine without increased intraabdominal pressure.  Patient met goals and ready for discharged and continue with HEP independently. Reports over 75% improvement.    Goals:   STG's: 4 weeks  Patient will report > 50% improvement in urinary symptoms for improved QOL MET  Patient will be able to actively and consistently contract PFM 5/5 trials for progress toward LTG MET  Patient will be independent with prolapse precautions and pelvic brace with lifting MET  Patient will be able to perform HEP with minimal verbal cues MET     LTG's: By discharge  Patient will report >75% improvement in urinary symptoms MET  Patient will report decreasing bladder irritant consumption by 2 servings and increasing water by 1 serving MET  Patient will report an  elimination of pad usage with no incontinence x 2-3 weeks for improved QOL MET except for 1 leakage.  Patient will report an elimination of urinary urgency MET   Patient will report an elimination of nocturia 0-1x: MET  Patient will report improved voiding frequency to once every 3-4 hours for improved function with ADLs.  Patient will be independent with HEP        Recommendations: Discharge    PT Signature: Monalisa Bailey PT  [place] License: [id number]      Based upon review of the patient's progress and continued therapy plan, it is my medical opinion that Kathleen Goetz should be discharge from physical therapy treatment at [facility] PHYSICAL THERAP[facility]  [address] [place] [id number] [phone].    Signature: __________________________________    Rachele Paulino MD  NPI: [id number]     Timed:  924 952      Manual Therapy:    0     mins  05167;     Therapeutic Exercise:    0     mins  14792;     Neuromuscular Allen:    0    mins  89851;    Therapeutic Activity:     28     mins  29631;     Gait Trainin     mins  43798;     Ultrasound:     0     mins  67853;    Ionto                               0    mins   83602  Self Care                       0     mins   78905  Aquatic                          0     mins 23815      Un-Timed:  Electrical Stimulation:    0     mins  93159 ( );  Dry Needling     0     mins self-pay  Traction     0     mins 40228  Low Eval     0     Mins  27974  Mod Eval     0     Mins  44973  High Eval                       0     Mins  97372  Re-Eval                           0    mins  02247      Timed Treatment:   28   mins   Total Treatment:     28   mins

## 2024-04-05 ENCOUNTER — HOSPITAL ENCOUNTER (OUTPATIENT)
Dept: MAMMOGRAPHY | Facility: HOSPITAL | Age: 74
Discharge: HOME | End: 2024-04-05
Payer: MEDICARE

## 2024-04-05 ENCOUNTER — HOSPITAL ENCOUNTER (OUTPATIENT)
Dept: ULTRASOUND IMAGING | Facility: HOSPITAL | Age: 74
Discharge: HOME | End: 2024-04-05
Payer: MEDICARE

## 2024-04-05 DIAGNOSIS — N64.9 BREAST LESION: ICD-10-CM

## 2024-04-05 PROCEDURE — 77065 DX MAMMO INCL CAD UNI: CPT

## 2024-04-05 PROCEDURE — G0279 TOMOSYNTHESIS, MAMMO: HCPCS

## 2024-04-05 PROCEDURE — 76642 ULTRASOUND BREAST LIMITED: CPT

## 2024-08-26 ENCOUNTER — TRANSCRIBE ORDERS (OUTPATIENT)
Dept: INTERNAL MEDICINE | Facility: CLINIC | Age: 74
End: 2024-08-26
Payer: MEDICARE

## 2024-08-26 DIAGNOSIS — Z12.31 VISIT FOR SCREENING MAMMOGRAM: Primary | ICD-10-CM

## 2024-09-25 LAB
NCCN CRITERIA FLAG: NORMAL
TYRER CUZICK SCORE: 9.9

## 2024-10-02 ENCOUNTER — HOSPITAL ENCOUNTER (OUTPATIENT)
Dept: MAMMOGRAPHY | Facility: HOSPITAL | Age: 74
Discharge: HOME | End: 2024-10-02
Admitting: INTERNAL MEDICINE
Payer: MEDICARE

## 2024-10-02 DIAGNOSIS — Z12.31 VISIT FOR SCREENING MAMMOGRAM: ICD-10-CM

## 2024-10-02 PROCEDURE — 77063 BREAST TOMOSYNTHESIS BI: CPT

## 2024-10-02 PROCEDURE — 77067 SCR MAMMO BI INCL CAD: CPT

## 2024-10-21 ENCOUNTER — APPOINTMENT (OUTPATIENT)
Dept: MRI IMAGING | Facility: HOSPITAL | Age: 74
End: 2024-10-21
Payer: MEDICARE

## 2024-10-21 ENCOUNTER — APPOINTMENT (OUTPATIENT)
Dept: GENERAL RADIOLOGY | Facility: HOSPITAL | Age: 74
End: 2024-10-21
Payer: MEDICARE

## 2024-10-21 ENCOUNTER — HOSPITAL ENCOUNTER (INPATIENT)
Facility: HOSPITAL | Age: 74
LOS: 3 days | Discharge: HOME | End: 2024-10-25
Attending: EMERGENCY MEDICINE | Admitting: STUDENT IN AN ORGANIZED HEALTH CARE EDUCATION/TRAINING PROGRAM
Payer: MEDICARE

## 2024-10-21 DIAGNOSIS — R11.2 NAUSEA AND VOMITING, UNSPECIFIED VOMITING TYPE: ICD-10-CM

## 2024-10-21 DIAGNOSIS — R42 VERTIGO: Primary | ICD-10-CM

## 2024-10-21 DIAGNOSIS — H81.10 BPPV (BENIGN PAROXYSMAL POSITIONAL VERTIGO), UNSPECIFIED LATERALITY: ICD-10-CM

## 2024-10-21 PROBLEM — R73.09 ELEVATED GLUCOSE: Status: ACTIVE | Noted: 2024-10-21

## 2024-10-21 LAB
ALBUMIN SERPL-MCNC: 4.4 G/DL (ref 3.5–5.2)
ALBUMIN/GLOB SERPL: 1.6 G/DL
ALP SERPL-CCNC: 68 U/L (ref 39–117)
ALT SERPL W P-5'-P-CCNC: 18 U/L (ref 1–33)
ANION GAP SERPL CALCULATED.3IONS-SCNC: 12 MMOL/L (ref 5–15)
AST SERPL-CCNC: 21 U/L (ref 1–32)
BACTERIA UR QL AUTO: NORMAL /HPF
BASOPHILS # BLD AUTO: 0.06 10*3/MM3 (ref 0–0.2)
BASOPHILS NFR BLD AUTO: 0.7 % (ref 0–1.5)
BILIRUB SERPL-MCNC: 0.2 MG/DL (ref 0–1.2)
BILIRUB UR QL STRIP: NEGATIVE
BUN SERPL-MCNC: 14 MG/DL (ref 8–23)
BUN/CREAT SERPL: 15.9 (ref 7–25)
CALCIUM SPEC-SCNC: 10.1 MG/DL (ref 8.6–10.5)
CHLORIDE SERPL-SCNC: 103 MMOL/L (ref 98–107)
CLARITY UR: CLEAR
CO2 SERPL-SCNC: 27 MMOL/L (ref 22–29)
COLOR UR: YELLOW
CREAT SERPL-MCNC: 0.88 MG/DL (ref 0.57–1)
D-LACTATE SERPL-SCNC: 1.7 MMOL/L (ref 0.5–2)
D-LACTATE SERPL-SCNC: 2.6 MMOL/L (ref 0.5–2)
DEPRECATED RDW RBC AUTO: 42.2 FL (ref 37–54)
EGFRCR SERPLBLD CKD-EPI 2021: 69.5 ML/MIN/1.73
EOSINOPHIL # BLD AUTO: 0.24 10*3/MM3 (ref 0–0.4)
EOSINOPHIL NFR BLD AUTO: 2.8 % (ref 0.3–6.2)
ERYTHROCYTE [DISTWIDTH] IN BLOOD BY AUTOMATED COUNT: 12.8 % (ref 12.3–15.4)
GLOBULIN UR ELPH-MCNC: 2.7 GM/DL
GLUCOSE SERPL-MCNC: 144 MG/DL (ref 65–99)
GLUCOSE UR STRIP-MCNC: NEGATIVE MG/DL
HCT VFR BLD AUTO: 44.3 % (ref 34–46.6)
HGB BLD-MCNC: 14.7 G/DL (ref 12–15.9)
HGB UR QL STRIP.AUTO: NEGATIVE
HOLD SPECIMEN: NORMAL
HYALINE CASTS UR QL AUTO: NORMAL /LPF
IMM GRANULOCYTES # BLD AUTO: 0.03 10*3/MM3 (ref 0–0.05)
IMM GRANULOCYTES NFR BLD AUTO: 0.4 % (ref 0–0.5)
KETONES UR QL STRIP: ABNORMAL
LEUKOCYTE ESTERASE UR QL STRIP.AUTO: ABNORMAL
LIPASE SERPL-CCNC: 35 U/L (ref 13–60)
LYMPHOCYTES # BLD AUTO: 2.48 10*3/MM3 (ref 0.7–3.1)
LYMPHOCYTES NFR BLD AUTO: 29.3 % (ref 19.6–45.3)
MAGNESIUM SERPL-MCNC: 2.2 MG/DL (ref 1.6–2.4)
MCH RBC QN AUTO: 29.9 PG (ref 26.6–33)
MCHC RBC AUTO-ENTMCNC: 33.2 G/DL (ref 31.5–35.7)
MCV RBC AUTO: 90.2 FL (ref 79–97)
MONOCYTES # BLD AUTO: 0.75 10*3/MM3 (ref 0.1–0.9)
MONOCYTES NFR BLD AUTO: 8.9 % (ref 5–12)
NEUTROPHILS NFR BLD AUTO: 4.89 10*3/MM3 (ref 1.7–7)
NEUTROPHILS NFR BLD AUTO: 57.9 % (ref 42.7–76)
NITRITE UR QL STRIP: NEGATIVE
NRBC BLD AUTO-RTO: 0 /100 WBC (ref 0–0.2)
PH UR STRIP.AUTO: 7 [PH] (ref 5–8)
PLATELET # BLD AUTO: 225 10*3/MM3 (ref 140–450)
PMV BLD AUTO: 11.3 FL (ref 6–12)
POTASSIUM SERPL-SCNC: 3.9 MMOL/L (ref 3.5–5.2)
PROT SERPL-MCNC: 7.1 G/DL (ref 6–8.5)
PROT UR QL STRIP: NEGATIVE
RBC # BLD AUTO: 4.91 10*6/MM3 (ref 3.77–5.28)
RBC # UR STRIP: NORMAL /HPF
REF LAB TEST METHOD: NORMAL
SODIUM SERPL-SCNC: 142 MMOL/L (ref 136–145)
SP GR UR STRIP: 1.02 (ref 1–1.03)
SQUAMOUS #/AREA URNS HPF: NORMAL /HPF
UROBILINOGEN UR QL STRIP: ABNORMAL
WBC # UR STRIP: NORMAL /HPF
WBC NRBC COR # BLD AUTO: 8.45 10*3/MM3 (ref 3.4–10.8)
WHOLE BLOOD HOLD COAG: NORMAL
WHOLE BLOOD HOLD SPECIMEN: NORMAL

## 2024-10-21 PROCEDURE — 36415 COLL VENOUS BLD VENIPUNCTURE: CPT

## 2024-10-21 PROCEDURE — G0378 HOSPITAL OBSERVATION PER HR: HCPCS

## 2024-10-21 PROCEDURE — 83735 ASSAY OF MAGNESIUM: CPT | Performed by: PEDIATRICS

## 2024-10-21 PROCEDURE — 83036 HEMOGLOBIN GLYCOSYLATED A1C: CPT

## 2024-10-21 PROCEDURE — 99222 1ST HOSP IP/OBS MODERATE 55: CPT

## 2024-10-21 PROCEDURE — 25010000002 ONDANSETRON PER 1 MG: Performed by: EMERGENCY MEDICINE

## 2024-10-21 PROCEDURE — 25810000003 SODIUM CHLORIDE 0.9 % SOLUTION: Performed by: EMERGENCY MEDICINE

## 2024-10-21 PROCEDURE — 83605 ASSAY OF LACTIC ACID: CPT | Performed by: EMERGENCY MEDICINE

## 2024-10-21 PROCEDURE — 83690 ASSAY OF LIPASE: CPT | Performed by: EMERGENCY MEDICINE

## 2024-10-21 PROCEDURE — 99285 EMERGENCY DEPT VISIT HI MDM: CPT

## 2024-10-21 PROCEDURE — 85025 COMPLETE CBC W/AUTO DIFF WBC: CPT | Performed by: EMERGENCY MEDICINE

## 2024-10-21 PROCEDURE — 81001 URINALYSIS AUTO W/SCOPE: CPT | Performed by: EMERGENCY MEDICINE

## 2024-10-21 PROCEDURE — 70551 MRI BRAIN STEM W/O DYE: CPT

## 2024-10-21 PROCEDURE — 70200 X-RAY EXAM OF EYE SOCKETS: CPT

## 2024-10-21 PROCEDURE — 80053 COMPREHEN METABOLIC PANEL: CPT | Performed by: EMERGENCY MEDICINE

## 2024-10-21 RX ORDER — MECLIZINE HYDROCHLORIDE 25 MG/1
25 TABLET ORAL ONCE
Status: COMPLETED | OUTPATIENT
Start: 2024-10-21 | End: 2024-10-21

## 2024-10-21 RX ORDER — MIDAZOLAM HYDROCHLORIDE 1 MG/ML
1 INJECTION INTRAMUSCULAR; INTRAVENOUS ONCE
Status: DISCONTINUED | OUTPATIENT
Start: 2024-10-21 | End: 2024-10-22

## 2024-10-21 RX ORDER — ONDANSETRON 2 MG/ML
4 INJECTION INTRAMUSCULAR; INTRAVENOUS ONCE
Status: COMPLETED | OUTPATIENT
Start: 2024-10-21 | End: 2024-10-21

## 2024-10-21 RX ORDER — SCOLOPAMINE TRANSDERMAL SYSTEM 1 MG/1
1 PATCH, EXTENDED RELEASE TRANSDERMAL ONCE
Status: COMPLETED | OUTPATIENT
Start: 2024-10-21 | End: 2024-10-24

## 2024-10-21 RX ORDER — SODIUM CHLORIDE 9 MG/ML
10 INJECTION, SOLUTION INTRAMUSCULAR; INTRAVENOUS; SUBCUTANEOUS AS NEEDED
Status: DISCONTINUED | OUTPATIENT
Start: 2024-10-21 | End: 2024-10-25 | Stop reason: HOSPADM

## 2024-10-21 RX ADMIN — SCOPOLAMINE 1 PATCH: 1.5 PATCH, EXTENDED RELEASE TRANSDERMAL at 22:06

## 2024-10-21 RX ADMIN — MECLIZINE HYDROCHLORIDE 25 MG: 25 TABLET ORAL at 19:11

## 2024-10-21 RX ADMIN — ONDANSETRON 4 MG: 2 INJECTION INTRAMUSCULAR; INTRAVENOUS at 17:07

## 2024-10-21 RX ADMIN — SODIUM CHLORIDE 1000 ML: 9 INJECTION, SOLUTION INTRAVENOUS at 17:07

## 2024-10-21 NOTE — ED PROVIDER NOTES
" EMERGENCY DEPARTMENT ENCOUNTER    Pt Name: Kathleen Goetz  MRN: [id number]  Pt :   1950  Room Number:  33/33  Date of encounter:  10/21/2024  PCP: Rachele Paulino MD  ED Provider: Destin Cortez MD    Historian: Patient and paramedic      HPI:  Chief Complaint: Dizziness and vomiting        Context: Kathleen Goetz is a 74 y.o. female who presents to the ED c/o sudden onset of dizziness relatively quickly followed by nausea and vomiting as well as loose stools.  Her symptoms started at 1231 and a home repair manage showed up at her house.  She had been washing her dishes but did not have any sudden movements, twists of her head, bending over, etc.  This is never happened to her in the past.  She feels better when her eyes are closed.  The patient denies loss of strength or sensation in her extremities.  No difficulty with swallowing or speech.  No prior history of CVA or seizure.      PAST MEDICAL HISTORY  Past Medical History:   Diagnosis Date    Anemia     as a teenager    Anemia     Annual physical exam 2017    Arthritis 2015    Somewhat mitigated by wax baths, Aleve, braces    Cataract 2018 or earlier    developing slowly    Colon polyp ?    Removed during colonoscopy    Foot fracture, left     Foot fracture, left     Heart murmur early     Hasn't caused any problems    Low back pain Early     No longer a problem    Pain of right thumb     Pneumonia Early     \"walking\" pneumonia         PAST SURGICAL HISTORY  Past Surgical History:   Procedure Laterality Date    COLONOSCOPY      Every 5-6 years since age 41.  Last time ?    COSMETIC SURGERY      Eyelid surgery to mitigate vision impediment    EYE SURGERY  early     Removal of metal fragment         FAMILY HISTORY  Family History   Problem Relation Age of Onset    Breast cancer Mother 81    Arthritis Mother         Disfigured fingers.    Cancer Mother         Breast cancer 10-yr survivor; death unrelated " "   Depression Mother         Received shock therapy for several episodes    Miscarriages / Stillbirths Mother         Probably at least 1 miscarriage    Colon cancer Father     Arthritis Father     Cancer Father          of colon cancer @ 95    Hearing loss Father         Deaf in one ear; with increasing age, deafness in both ears    Lung cancer Sister     Breast cancer Sister 67    Arthritis Other     Mental illness Other         disorder    Colon cancer Other     Lung cancer Other     Cancer Sister         Breast cancer ;  of liver cancer 2015    Depression Sister         Medicated for bi-polar disorder    Miscarriages / Stillbirths Sister         At least 3 abortions    Arthritis Sister         complained of soreness in hands in her late 60s-early 70s    Depression Sister         Suicide attempt  followed by medication & some talk therapy; successful suicide     Depression Maternal Grandmother         Described as having \"blue days\"    Arthritis Maternal Grandmother         Both osteo- and rheumatoid arthritis.  Last year or two of life spent in wheelchair.    Vision loss Maternal Aunt         Glaucoma    Ovarian cancer Neg Hx          SOCIAL HISTORY  Social History     Socioeconomic History    Marital status:    Tobacco Use    Smoking status: Never    Smokeless tobacco: Never    Tobacco comments:     My  smoked a pipe during the first 4 months of our marriage in    Vaping Use    Vaping status: Never Used   Substance and Sexual Activity    Alcohol use: Not Currently     Comment: 1 glass of wine in 2022; formerly 6-9 glasses/yr    Drug use: No    Sexual activity: Not Currently     Partners: Male     Birth control/protection: Abstinence, Post-menopausal, None     Comment: Libido is beginning to return 2+ years into widowhood.         ALLERGIES  Patient has no known allergies.        REVIEW OF SYSTEMS  Review of Systems       All systems reviewed and negative except for " those discussed in HPI.       PHYSICAL EXAM    I have reviewed the triage vital signs and nursing notes.    ED Triage Vitals   Temp Heart Rate Resp BP SpO2   10/21/24 1558 10/21/24 1558 10/21/24 1558 10/21/24 1609 10/21/24 1558   97.6 °F (36.4 °C) 62 16 135/69 96 %      Temp src Heart Rate Source Patient Position BP Location FiO2 (%)   10/21/24 1558 -- -- -- --   Oral           Physical Exam  GENERAL:   Appears miserable, only opening her eyes on occasion while I am in the room as this causes her increased nausea.  She is holding an emesis bag in her hand and retches forcefully while I am in the room.  HENT: Nares patent.  No lingual trauma  EYES: No scleral icterus.  PERRL at 3 mm  CV: Regular rhythm, regular rate.  No murmurs gallops rubs.  Clear to auscultation.  RESPIRATORY: Normal effort.  No audible wheezes, rales or rhonchi.    ABDOMEN: Soft, nontender  MUSCULOSKELETAL: No deformities.   NEURO: Alert, moves all extremities, follows commands.  Hints exam negative.  No pronator drift or leg drift.  Cranial nerves intact to exam.  SKIN: Warm, dry, no rash visualized.      LAB RESULTS  Recent Results (from the past 24 hours)   Comprehensive Metabolic Panel    Collection Time: 10/21/24  4:02 PM    Specimen: Blood   Result Value Ref Range    Glucose 144 (H) 65 - 99 mg/dL    BUN 14 8 - 23 mg/dL    Creatinine 0.88 0.57 - 1.00 mg/dL    Sodium 142 136 - 145 mmol/L    Potassium 3.9 3.5 - 5.2 mmol/L    Chloride 103 98 - 107 mmol/L    CO2 27.0 22.0 - 29.0 mmol/L    Calcium 10.1 8.6 - 10.5 mg/dL    Total Protein 7.1 6.0 - 8.5 g/dL    Albumin 4.4 3.5 - 5.2 g/dL    ALT (SGPT) 18 1 - 33 U/L    AST (SGOT) 21 1 - 32 U/L    Alkaline Phosphatase 68 39 - 117 U/L    Total Bilirubin 0.2 0.0 - 1.2 mg/dL    Globulin 2.7 gm/dL    A/G Ratio 1.6 g/dL    BUN/Creatinine Ratio 15.9 7.0 - 25.0    Anion Gap 12.0 5.0 - 15.0 mmol/L    eGFR 69.5 >60.0 mL/min/1.73   Lipase    Collection Time: 10/21/24  4:02 PM    Specimen: Blood   Result Value  Ref Range    Lipase 35 13 - 60 U/L   Lactic Acid, Plasma    Collection Time: 10/21/24  4:02 PM    Specimen: Blood   Result Value Ref Range    Lactate 2.6 (C) 0.5 - 2.0 mmol/L   Green Top (Gel)    Collection Time: 10/21/24  4:02 PM   Result Value Ref Range    Extra Tube Hold for add-ons.    Lavender Top    Collection Time: 10/21/24  4:02 PM   Result Value Ref Range    Extra Tube hold for add-on    Gold Top - SST    Collection Time: 10/21/24  4:02 PM   Result Value Ref Range    Extra Tube Hold for add-ons.    Gray Top    Collection Time: 10/21/24  4:02 PM   Result Value Ref Range    Extra Tube Hold for add-ons.    Light Blue Top    Collection Time: 10/21/24  4:02 PM   Result Value Ref Range    Extra Tube Hold for add-ons.    CBC Auto Differential    Collection Time: 10/21/24  4:02 PM    Specimen: Blood   Result Value Ref Range    WBC 8.45 3.40 - 10.80 10*3/mm3    RBC 4.91 3.77 - 5.28 10*6/mm3    Hemoglobin 14.7 12.0 - 15.9 g/dL    Hematocrit 44.3 34.0 - 46.6 %    MCV 90.2 79.0 - 97.0 fL    MCH 29.9 26.6 - 33.0 pg    MCHC 33.2 31.5 - 35.7 g/dL    RDW 12.8 12.3 - 15.4 %    RDW-SD 42.2 37.0 - 54.0 fl    MPV 11.3 6.0 - 12.0 fL    Platelets 225 140 - 450 10*3/mm3    Neutrophil % 57.9 42.7 - 76.0 %    Lymphocyte % 29.3 19.6 - 45.3 %    Monocyte % 8.9 5.0 - 12.0 %    Eosinophil % 2.8 0.3 - 6.2 %    Basophil % 0.7 0.0 - 1.5 %    Immature Grans % 0.4 0.0 - 0.5 %    Neutrophils, Absolute 4.89 1.70 - 7.00 10*3/mm3    Lymphocytes, Absolute 2.48 0.70 - 3.10 10*3/mm3    Monocytes, Absolute 0.75 0.10 - 0.90 10*3/mm3    Eosinophils, Absolute 0.24 0.00 - 0.40 10*3/mm3    Basophils, Absolute 0.06 0.00 - 0.20 10*3/mm3    Immature Grans, Absolute 0.03 0.00 - 0.05 10*3/mm3    nRBC 0.0 0.0 - 0.2 /100 WBC   Urinalysis With Microscopic If Indicated (No Culture) - Urine, Clean Catch    Collection Time: 10/21/24  6:57 PM    Specimen: Urine, Clean Catch   Result Value Ref Range    Color, UA Yellow Yellow, Straw    Appearance, UA Clear Clear     pH, UA 7.0 5.0 - 8.0    Specific Gravity, UA 1.018 1.001 - 1.030    Glucose, UA Negative Negative    Ketones, UA 15 mg/dL (1+) (A) Negative    Bilirubin, UA Negative Negative    Blood, UA Negative Negative    Protein, UA Negative Negative    Leuk Esterase, UA Trace (A) Negative    Nitrite, UA Negative Negative    Urobilinogen, UA 0.2 E.U./dL 0.2 - 1.0 E.U./dL   Urinalysis, Microscopic Only - Urine, Clean Catch    Collection Time: 10/21/24  6:57 PM    Specimen: Urine, Clean Catch   Result Value Ref Range    RBC, UA 0-2 None Seen, 0-2 /HPF    WBC, UA 0-2 None Seen, 0-2 /HPF    Bacteria, UA None Seen None Seen, Trace /HPF    Squamous Epithelial Cells, UA 0-2 None Seen, 0-2 /HPF    Hyaline Casts, UA 0-6 0 - 6 /LPF    Methodology Automated Microscopy    STAT Lactic Acid, Reflex    Collection Time: 10/21/24  7:14 PM    Specimen: Blood   Result Value Ref Range    Lactate 1.7 0.5 - 2.0 mmol/L       If labs were ordered, I independently reviewed the results and considered them in treating the patient.        RADIOLOGY  XR Orbits 4+ View    Result Date: 10/21/2024  XR ORBITS 4+ VW Date of Exam: 10/21/2024 5:37 PM EDT Indication: MRI clearance Comparison: None available. Findings: No metallic foreign bodies are seen in the orbits. No acute bony abnormalities.     Impression: No metallic foreign bodies are seen in the orbits. Electronically Signed: Micah Garrett DO  10/21/2024 6:06 PM EDT  Workstation ID: [id number]     I ordered and independently reviewed the above noted radiographic studies.      I viewed images of MRI brain which showed no evidence of acute CVA per my independent interpretation.    See radiologist's dictation for official interpretation.        PROCEDURES    Procedures    No orders to display       MEDICATIONS GIVEN IN ER    Medications   Sodium Chloride (PF) 0.9 % 10 mL (has no administration in time range)   scopolamine patch 1 mg/72 hr (has no administration in time range)   midazolam (VERSED) injection 1  mg (1 mg Intravenous Not Given 10/21/24 2120)   sodium chloride 0.9 % bolus 1,000 mL (0 mL Intravenous Stopped 10/21/24 1857)   ondansetron (ZOFRAN) injection 4 mg (4 mg Intravenous Given 10/21/24 1707)   meclizine (ANTIVERT) tablet 25 mg (25 mg Oral Given 10/21/24 1911)         MEDICAL DECISION MAKING, PROGRESS, and CONSULTS    All labs, if obtained, have been independently reviewed by me.  All radiology studies, if obtained, have been reviewed by me and the radiologist dictating the report.  All EKG's, if obtained, have been independently viewed and interpreted by me/my attending physician.      Discussion below represents my analysis of pertinent findings related to patient's condition, differential diagnosis, treatment plan and final disposition.             [unfilled]                   Differential diagnosis:    Central versus peripheral vertigo.      Additional sources:    - Discussed/ obtained information from independent historians: Paramedics gave report at bedside.    - External (non-ED) record review: Reviewed prior records to include physical therapy note when the patient was being treated for pelvic floor dysfunction, progress note dated 3/13/2024.    - Chronic or social conditions impacting care: Lifelong non-smoker.        Orders placed during this visit:  Orders Placed This Encounter   Procedures    MRI Brain Without Contrast    XR Orbits 4+ View    [place] Draw    Comprehensive Metabolic Panel    Lipase    Urinalysis With Microscopic If Indicated (No Culture) - Urine, Clean Catch    Lactic Acid, Plasma    CBC Auto Differential    STAT Lactic Acid, Reflex    Urinalysis, Microscopic Only - Urine, Clean Catch    NPO Diet NPO Type: Strict NPO    Undress & Gown    Nursing Dysphagia Screening (Complete Prior to Giving Anything By Mouth)    Insert Peripheral IV    Initiate Observation Status    CBC & Differential    Green Top (Gel)    Lavender Top    Gold Top - SST    Gray Top    Light Blue  Top         Additional orders considered but not ordered:  MRI brain with contrast    ED Course:    Consultants:      ED Course as of 10/21/24 2142   Mon Oct 21, 2024   1618 Retchiing actively. [MS]   1853 I have ordered Versed for both vertigo as well as anxiety about MRI.  The patient has already received Zofran.  We will hold off on placing the scopolamine patch that I have ordered until the patient is back for MRI. [MS]   1918 I ordered swallow screen and then Antivert.  The Antivert has not been administered. [MS]   1954 Lactate(!!): 2.6 [MS]   1954 Lactate: 1.7 [MS]   1954 Ketones, UA(!): 15 mg/dL (1+) [MS]   1954 Glucose(!): 144 [MS]   2106 Patient has severe persistent vertigo.  Regardless of her MRI she will require admission.  I am contacting Dr. Turner, hospitalist. [MS]   2141 Spoke with patient and she is in agreement with admission. [MS]      ED Course User Index  [MS] Destin Cortez MD              Shared Decision Making:  After my consideration of clinical presentation and any laboratory/radiology studies obtained, I discussed the findings with the patient/patient representative who is in agreement with the treatment plan and the final disposition.   Risks and benefits of discharge and/or observation/admission were discussed.       AS OF 21:42 EDT VITALS:    BP - 127/55  HR - 57  TEMP - 97.6 °F (36.4 °C) (Oral)  O2 SATS - 93%                  DIAGNOSIS  Final diagnoses:   Vertigo   Nausea and vomiting, unspecified vomiting type         DISPOSITION  Admission      Please note that portions of this document were completed with voice recognition software.        Destin Cortez MD  10/21/24 6282

## 2024-10-22 ENCOUNTER — APPOINTMENT (OUTPATIENT)
Dept: CARDIOLOGY | Facility: HOSPITAL | Age: 74
End: 2024-10-22
Payer: MEDICARE

## 2024-10-22 LAB
ALBUMIN SERPL-MCNC: 3.8 G/DL (ref 3.5–5.2)
ALBUMIN/GLOB SERPL: 1.9 G/DL
ALP SERPL-CCNC: 57 U/L (ref 39–117)
ALT SERPL W P-5'-P-CCNC: 13 U/L (ref 1–33)
ANION GAP SERPL CALCULATED.3IONS-SCNC: 9 MMOL/L (ref 5–15)
ASCENDING AORTA: 3 CM
AST SERPL-CCNC: 18 U/L (ref 1–32)
BASOPHILS # BLD AUTO: 0.02 10*3/MM3 (ref 0–0.2)
BASOPHILS NFR BLD AUTO: 0.2 % (ref 0–1.5)
BH CV ECHO MEAS - AO MAX PG: 11.7 MMHG
BH CV ECHO MEAS - AO MEAN PG: 5 MMHG
BH CV ECHO MEAS - AO ROOT DIAM: 3.1 CM
BH CV ECHO MEAS - AO V2 MAX: 171 CM/SEC
BH CV ECHO MEAS - AO V2 VTI: 37 CM
BH CV ECHO MEAS - AVA(I,D): 2.8 CM2
BH CV ECHO MEAS - EDV(CUBED): 68.9 ML
BH CV ECHO MEAS - EDV(MOD-SP2): 76.3 ML
BH CV ECHO MEAS - EDV(MOD-SP4): 76.1 ML
BH CV ECHO MEAS - EF(MOD-BP): 63.9 %
BH CV ECHO MEAS - EF(MOD-SP2): 62.4 %
BH CV ECHO MEAS - EF(MOD-SP4): 64.5 %
BH CV ECHO MEAS - ESV(CUBED): 17.6 ML
BH CV ECHO MEAS - ESV(MOD-SP2): 28.7 ML
BH CV ECHO MEAS - ESV(MOD-SP4): 27 ML
BH CV ECHO MEAS - FS: 36.6 %
BH CV ECHO MEAS - IVS/LVPW: 1 CM
BH CV ECHO MEAS - IVSD: 0.7 CM
BH CV ECHO MEAS - LA DIMENSION: 3.3 CM
BH CV ECHO MEAS - LAT PEAK E' VEL: 13.5 CM/SEC
BH CV ECHO MEAS - LV DIASTOLIC VOL/BSA (35-75): 44.3 CM2
BH CV ECHO MEAS - LV MASS(C)D: 81.7 GRAMS
BH CV ECHO MEAS - LV MAX PG: 7.7 MMHG
BH CV ECHO MEAS - LV MEAN PG: 4 MMHG
BH CV ECHO MEAS - LV SYSTOLIC VOL/BSA (12-30): 15.7 CM2
BH CV ECHO MEAS - LV V1 MAX: 139 CM/SEC
BH CV ECHO MEAS - LV V1 VTI: 33.1 CM
BH CV ECHO MEAS - LVIDD: 4.1 CM
BH CV ECHO MEAS - LVIDS: 2.6 CM
BH CV ECHO MEAS - LVOT AREA: 3.1 CM2
BH CV ECHO MEAS - LVOT DIAM: 2 CM
BH CV ECHO MEAS - LVPWD: 0.7 CM
BH CV ECHO MEAS - MED PEAK E' VEL: 9.6 CM/SEC
BH CV ECHO MEAS - MV A MAX VEL: 51.4 CM/SEC
BH CV ECHO MEAS - MV DEC SLOPE: 322 CM/SEC2
BH CV ECHO MEAS - MV DEC TIME: 0.2 SEC
BH CV ECHO MEAS - MV E MAX VEL: 97.5 CM/SEC
BH CV ECHO MEAS - MV E/A: 1.9
BH CV ECHO MEAS - MV MAX PG: 3.7 MMHG
BH CV ECHO MEAS - MV MEAN PG: 1 MMHG
BH CV ECHO MEAS - MV P1/2T: 88.7 MSEC
BH CV ECHO MEAS - MV V2 VTI: 32.5 CM
BH CV ECHO MEAS - MVA(P1/2T): 2.48 CM2
BH CV ECHO MEAS - MVA(VTI): 3.2 CM2
BH CV ECHO MEAS - PA ACC TIME: 0.1 SEC
BH CV ECHO MEAS - RAP SYSTOLE: 3 MMHG
BH CV ECHO MEAS - RVSP: 22 MMHG
BH CV ECHO MEAS - SV(LVOT): 104 ML
BH CV ECHO MEAS - SV(MOD-SP2): 47.6 ML
BH CV ECHO MEAS - SV(MOD-SP4): 49.1 ML
BH CV ECHO MEAS - SVI(LVOT): 60.5 ML/M2
BH CV ECHO MEAS - SVI(MOD-SP2): 27.7 ML/M2
BH CV ECHO MEAS - SVI(MOD-SP4): 28.6 ML/M2
BH CV ECHO MEAS - TAPSE (>1.6): 2.9 CM
BH CV ECHO MEAS - TR MAX PG: 28.9 MMHG
BH CV ECHO MEAS - TR MAX VEL: 236.3 CM/SEC
BH CV ECHO MEASUREMENTS AVERAGE E/E' RATIO: 8.44
BH CV XLRA - RV BASE: 3.8 CM
BH CV XLRA - RV LENGTH: 6.7 CM
BH CV XLRA - RV MID: 2.6 CM
BH CV XLRA - TDI S': 12 CM/SEC
BILIRUB SERPL-MCNC: 0.2 MG/DL (ref 0–1.2)
BUN SERPL-MCNC: 13 MG/DL (ref 8–23)
BUN/CREAT SERPL: 15.1 (ref 7–25)
CALCIUM SPEC-SCNC: 9 MG/DL (ref 8.6–10.5)
CHLORIDE SERPL-SCNC: 103 MMOL/L (ref 98–107)
CO2 SERPL-SCNC: 26 MMOL/L (ref 22–29)
CREAT SERPL-MCNC: 0.86 MG/DL (ref 0.57–1)
DEPRECATED RDW RBC AUTO: 42.2 FL (ref 37–54)
EGFRCR SERPLBLD CKD-EPI 2021: 71.4 ML/MIN/1.73
EOSINOPHIL # BLD AUTO: 0.01 10*3/MM3 (ref 0–0.4)
EOSINOPHIL NFR BLD AUTO: 0.1 % (ref 0.3–6.2)
ERYTHROCYTE [DISTWIDTH] IN BLOOD BY AUTOMATED COUNT: 12.8 % (ref 12.3–15.4)
GLOBULIN UR ELPH-MCNC: 2 GM/DL
GLUCOSE SERPL-MCNC: 132 MG/DL (ref 65–99)
HBA1C MFR BLD: 5.2 % (ref 4.8–5.6)
HCT VFR BLD AUTO: 37.8 % (ref 34–46.6)
HGB BLD-MCNC: 12.7 G/DL (ref 12–15.9)
IMM GRANULOCYTES # BLD AUTO: 0.02 10*3/MM3 (ref 0–0.05)
IMM GRANULOCYTES NFR BLD AUTO: 0.2 % (ref 0–0.5)
LEFT ATRIUM VOLUME INDEX: 26.9 ML/M2
LYMPHOCYTES # BLD AUTO: 0.92 10*3/MM3 (ref 0.7–3.1)
LYMPHOCYTES NFR BLD AUTO: 9 % (ref 19.6–45.3)
MCH RBC QN AUTO: 30.3 PG (ref 26.6–33)
MCHC RBC AUTO-ENTMCNC: 33.6 G/DL (ref 31.5–35.7)
MCV RBC AUTO: 90.2 FL (ref 79–97)
MONOCYTES # BLD AUTO: 0.54 10*3/MM3 (ref 0.1–0.9)
MONOCYTES NFR BLD AUTO: 5.3 % (ref 5–12)
NEUTROPHILS NFR BLD AUTO: 8.74 10*3/MM3 (ref 1.7–7)
NEUTROPHILS NFR BLD AUTO: 85.2 % (ref 42.7–76)
NRBC BLD AUTO-RTO: 0 /100 WBC (ref 0–0.2)
PLATELET # BLD AUTO: 174 10*3/MM3 (ref 140–450)
PMV BLD AUTO: 11.6 FL (ref 6–12)
POTASSIUM SERPL-SCNC: 4.8 MMOL/L (ref 3.5–5.2)
PROT SERPL-MCNC: 5.8 G/DL (ref 6–8.5)
QT INTERVAL: 434 MS
QTC INTERVAL: 429 MS
RBC # BLD AUTO: 4.19 10*6/MM3 (ref 3.77–5.28)
SODIUM SERPL-SCNC: 138 MMOL/L (ref 136–145)
TSH SERPL DL<=0.05 MIU/L-ACNC: 1.23 UIU/ML (ref 0.27–4.2)
WBC NRBC COR # BLD AUTO: 10.25 10*3/MM3 (ref 3.4–10.8)

## 2024-10-22 PROCEDURE — 93306 TTE W/DOPPLER COMPLETE: CPT

## 2024-10-22 PROCEDURE — 97530 THERAPEUTIC ACTIVITIES: CPT

## 2024-10-22 PROCEDURE — 99232 SBSQ HOSP IP/OBS MODERATE 35: CPT | Performed by: INTERNAL MEDICINE

## 2024-10-22 PROCEDURE — 93005 ELECTROCARDIOGRAM TRACING: CPT

## 2024-10-22 PROCEDURE — 97166 OT EVAL MOD COMPLEX 45 MIN: CPT

## 2024-10-22 PROCEDURE — 25010000002 HEPARIN (PORCINE) PER 1000 UNITS

## 2024-10-22 PROCEDURE — 84443 ASSAY THYROID STIM HORMONE: CPT | Performed by: INTERNAL MEDICINE

## 2024-10-22 PROCEDURE — 85025 COMPLETE CBC W/AUTO DIFF WBC: CPT

## 2024-10-22 PROCEDURE — 80053 COMPREHEN METABOLIC PANEL: CPT

## 2024-10-22 PROCEDURE — 93306 TTE W/DOPPLER COMPLETE: CPT | Performed by: INTERNAL MEDICINE

## 2024-10-22 RX ORDER — HEPARIN SODIUM 5000 [USP'U]/ML
5000 INJECTION, SOLUTION INTRAVENOUS; SUBCUTANEOUS EVERY 12 HOURS SCHEDULED
Status: DISCONTINUED | OUTPATIENT
Start: 2024-10-22 | End: 2024-10-25 | Stop reason: HOSPADM

## 2024-10-22 RX ORDER — MECLIZINE HYDROCHLORIDE 25 MG/1
12.5 TABLET ORAL 3 TIMES DAILY PRN
Status: DISCONTINUED | OUTPATIENT
Start: 2024-10-22 | End: 2024-10-25 | Stop reason: HOSPADM

## 2024-10-22 RX ORDER — ONDANSETRON 4 MG/1
4 TABLET, ORALLY DISINTEGRATING ORAL EVERY 6 HOURS PRN
Status: DISCONTINUED | OUTPATIENT
Start: 2024-10-22 | End: 2024-10-25 | Stop reason: HOSPADM

## 2024-10-22 RX ORDER — POLYETHYLENE GLYCOL 3350 17 G/17G
17 POWDER, FOR SOLUTION ORAL DAILY PRN
Status: DISCONTINUED | OUTPATIENT
Start: 2024-10-22 | End: 2024-10-25 | Stop reason: HOSPADM

## 2024-10-22 RX ORDER — ONDANSETRON 2 MG/ML
4 INJECTION INTRAMUSCULAR; INTRAVENOUS EVERY 6 HOURS PRN
Status: DISCONTINUED | OUTPATIENT
Start: 2024-10-22 | End: 2024-10-25 | Stop reason: HOSPADM

## 2024-10-22 RX ORDER — BISACODYL 10 MG
10 SUPPOSITORY, RECTAL RECTAL DAILY PRN
Status: DISCONTINUED | OUTPATIENT
Start: 2024-10-22 | End: 2024-10-25 | Stop reason: HOSPADM

## 2024-10-22 RX ORDER — SODIUM CHLORIDE 0.9 % (FLUSH) 0.9 %
10 SYRINGE (ML) INJECTION AS NEEDED
Status: DISCONTINUED | OUTPATIENT
Start: 2024-10-22 | End: 2024-10-25 | Stop reason: HOSPADM

## 2024-10-22 RX ORDER — BISACODYL 5 MG/1
5 TABLET, DELAYED RELEASE ORAL DAILY PRN
Status: DISCONTINUED | OUTPATIENT
Start: 2024-10-22 | End: 2024-10-25 | Stop reason: HOSPADM

## 2024-10-22 RX ORDER — ACETAMINOPHEN 500 MG
1000 TABLET ORAL 3 TIMES DAILY
Status: DISCONTINUED | OUTPATIENT
Start: 2024-10-22 | End: 2024-10-23

## 2024-10-22 RX ORDER — SODIUM CHLORIDE 0.9 % (FLUSH) 0.9 %
10 SYRINGE (ML) INJECTION EVERY 12 HOURS SCHEDULED
Status: DISCONTINUED | OUTPATIENT
Start: 2024-10-22 | End: 2024-10-25 | Stop reason: HOSPADM

## 2024-10-22 RX ORDER — AMOXICILLIN 250 MG
2 CAPSULE ORAL 2 TIMES DAILY PRN
Status: DISCONTINUED | OUTPATIENT
Start: 2024-10-22 | End: 2024-10-25 | Stop reason: HOSPADM

## 2024-10-22 RX ORDER — CETIRIZINE HYDROCHLORIDE 10 MG/1
10 TABLET ORAL NIGHTLY
Status: DISCONTINUED | OUTPATIENT
Start: 2024-10-22 | End: 2024-10-25 | Stop reason: HOSPADM

## 2024-10-22 RX ADMIN — HEPARIN SODIUM 5000 UNITS: 5000 INJECTION INTRAVENOUS; SUBCUTANEOUS at 20:36

## 2024-10-22 RX ADMIN — MECLIZINE HYDROCHLORIDE 12.5 MG: 25 TABLET ORAL at 11:16

## 2024-10-22 RX ADMIN — CETIRIZINE HYDROCHLORIDE 10 MG: 10 TABLET, FILM COATED ORAL at 20:36

## 2024-10-22 RX ADMIN — ACETAMINOPHEN 1000 MG: 500 TABLET ORAL at 14:16

## 2024-10-22 RX ADMIN — HEPARIN SODIUM 5000 UNITS: 5000 INJECTION INTRAVENOUS; SUBCUTANEOUS at 01:03

## 2024-10-22 RX ADMIN — Medication 10 ML: at 20:37

## 2024-10-22 RX ADMIN — ACETAMINOPHEN 1000 MG: 500 TABLET ORAL at 08:13

## 2024-10-22 RX ADMIN — Medication 10 ML: at 08:14

## 2024-10-22 RX ADMIN — Medication 10 ML: at 01:17

## 2024-10-22 RX ADMIN — HEPARIN SODIUM 5000 UNITS: 5000 INJECTION INTRAVENOUS; SUBCUTANEOUS at 08:13

## 2024-10-22 RX ADMIN — ACETAMINOPHEN 1000 MG: 500 TABLET ORAL at 20:36

## 2024-10-22 NOTE — H&P
[facility] Medicine Services  HISTORY AND PHYSICAL    Patient Name: Kathleen Goetz  : 1950  MRN: [id number]  Primary Care Physician: Rachele Paulino MD  Date of admission: 10/21/2024    Subjective   Subjective     Chief Complaint:      HPI:  Kathleen Goetz is a 74 y.o. female with significant medical history for dyslipidemia, hypothyroidism, vitamin D deficiency, and pelvic floor dysfunction who presents to [facility] with complaints of dizziness and nausea vomiting.      Patient states onset of symptoms began approximately 12:30 this afternoon 10/21.  Prior to onset he had been doing some light housework laundry and dishes.  She ate lunch, and shortly after sitting on the commode she started getting dizzy.  She was able to place her head between her knees with relief of symptoms.  She called EMS after standing because symptoms began again.  She has had 6-7 episodes of vomiting.  Dizziness comes and goes.  She had vertigo symptoms decades ago but this is reportedly more severe, as she did not vomit on her last occurrence.  She lives at home by herself.  Denies headache or fever.  Positive for nausea, vomiting and negative for diarrhea.  Denies chest pain or shortness of breath.          In ED    Patient given scopolamine patch and meclizine    MRI reveals no acute intracranial abnormality. A few scattered periventricular and subcortical T2/FLAIR hyperintensities are nonspecific but most likely represent chronic small vessel ischemic changes.     Review of Systems   Constitutional:  Negative for appetite change, chills, fatigue and fever.   HENT: Negative.     Eyes:  Positive for photophobia.   Respiratory:  Negative for cough and chest tightness.    Cardiovascular:  Negative for chest pain.   Gastrointestinal:  Positive for nausea and vomiting. Negative for abdominal distention and diarrhea.   Endocrine: Negative.    Genitourinary:  Negative for difficulty  "urinating.   Musculoskeletal: Negative.    Skin: Negative.    Allergic/Immunologic: Negative.    Neurological:  Positive for dizziness and light-headedness. Negative for syncope.   Hematological: Negative.    Psychiatric/Behavioral: Negative.                Personal History     Past Medical History:   Diagnosis Date    Anemia     as a teenager    Anemia     Annual physical exam 05/09/2017    Arthritis 2015    Somewhat mitigated by wax baths, Aleve, braces    Cataract 2018 or earlier    developing slowly    Colon polyp 2009?    Removed during colonoscopy    Foot fracture, left     Foot fracture, left     Heart murmur early 1980s    Hasn't caused any problems    Low back pain Early 2000s    No longer a problem    Pain of right thumb     Pneumonia Early 2000s    \"walking\" pneumonia             Past Surgical History:   Procedure Laterality Date    COLONOSCOPY      Every 5-6 years since age 41.  Last time 2014?    COSMETIC SURGERY  2020    Eyelid surgery to mitigate vision impediment    EYE SURGERY  early 1980s    Removal of metal fragment       Family History: family history includes Arthritis in her father, maternal grandmother, mother, sister, and another family member; Breast cancer (age of onset: 67) in her sister; Breast cancer (age of onset: 81) in her mother; Cancer in her father, mother, and sister; Colon cancer in her father and another family member; Depression in her maternal grandmother, mother, sister, and sister; Hearing loss in her father; Lung cancer in her sister and another family member; Mental illness in an other family member; Miscarriages / Stillbirths in her mother and sister; Vision loss in her maternal aunt.     Social History:  reports that she has never smoked. She has never used smokeless tobacco. She reports that she does not currently use alcohol. She reports that she does not use drugs.  Social History     Social History Narrative    Not on file       Medications:  Calcium Carbonate, Osteo " Bi-Flex Triple Strength, Turmeric, cetirizine, clotrimazole-betamethasone, multivitamin, and mupirocin    No Known Allergies    Objective   Objective     Vital Signs:   Temp:  [97.6 °F (36.4 °C)] 97.6 °F (36.4 °C)  Heart Rate:  [52-62] 57  Resp:  [16] 16  BP: (123-137)/(54-69) 127/55    Physical Exam  Constitutional:       Appearance: Normal appearance.   Eyes:      Extraocular Movements: Extraocular movements intact.      Pupils: Pupils are equal, round, and reactive to light.   Cardiovascular:      Rate and Rhythm: Normal rate and regular rhythm.      Pulses: Normal pulses.      Heart sounds: Normal heart sounds.   Pulmonary:      Effort: Pulmonary effort is normal.      Breath sounds: Normal breath sounds.   Abdominal:      General: Bowel sounds are normal.      Palpations: Abdomen is soft.   Musculoskeletal:      Right lower leg: No edema.      Left lower leg: No edema.   Skin:     General: Skin is warm and dry.   Neurological:      General: No focal deficit present.      Mental Status: She is alert and oriented to person, place, and time.   Psychiatric:         Mood and Affect: Mood normal.         Behavior: Behavior normal.          Result Review:  I have personally reviewed the results from the time of this admission to 10/21/2024 22:56 EDT and agree with these findings:  [x]  Laboratory list / accordion  []  Microbiology  [x]  Radiology  []  EKG/Telemetry   []  Cardiology/Vascular   []  Pathology  [x]  Old records  []  Other:      LAB RESULTS:      Lab 10/21/24  1914 10/21/24  1602   WBC  --  8.45   HEMOGLOBIN  --  14.7   HEMATOCRIT  --  44.3   PLATELETS  --  225   NEUTROS ABS  --  4.89   IMMATURE GRANS (ABS)  --  0.03   LYMPHS ABS  --  2.48   MONOS ABS  --  0.75   EOS ABS  --  0.24   MCV  --  90.2   LACTATE 1.7 2.6*         Lab 10/21/24  1602   SODIUM 142   POTASSIUM 3.9   CHLORIDE 103   CO2 27.0   ANION GAP 12.0   BUN 14   CREATININE 0.88   EGFR 69.5   GLUCOSE 144*   CALCIUM 10.1         Lab 10/21/24  1602    TOTAL PROTEIN 7.1   ALBUMIN 4.4   GLOBULIN 2.7   ALT (SGPT) 18   AST (SGOT) 21   BILIRUBIN 0.2   ALK PHOS 68   LIPASE 35                     Brief Urine Lab Results  (Last result in the past 365 days)        Color   Clarity   Blood   Leuk Est   Nitrite   Protein   CREAT   Urine HCG        10/21/24 1857 Yellow   Clear   Negative   Trace   Negative   Negative                 Microbiology Results (last 10 days)       ** No results found for the last 240 hours. **            MRI Brain Without Contrast    Result Date: 10/21/2024  MRI BRAIN WO CONTRAST Date of Exam: 10/21/2024 9:06 PM EDT Indication: acute vertigo weakness, ct [id number].  Comparison: None available. Technique:  Routine multiplanar/multisequence sequence images of the brain were obtained without contrast administration. Findings: No acute infarct or abnormal restricted diffusion. There is no evidence of hemorrhage. No mass effect, edema or midline shift Few scattered periventricular and subcortical T2/FLAIR hyperintensities are nonspecific. No extra-axial fluid collection. Prominent ventricular system secondary to chronic parenchymal volume loss. The visualized flow voids are unremarkable. Partially empty sella. Otherwise the midline structures are unremarkable. Status post bilateral lens extraction. Otherwise the orbits unremarkable. The visualized paranasal sinuses and mastoid air cells are clear. The visualized soft tissues are unremarkable. No acute osseous abnormality.     Impression: Impression: No acute intracranial abnormality. A few scattered periventricular and subcortical T2/FLAIR hyperintensities are nonspecific but most likely represent chronic small vessel ischemic changes. Electronically Signed: Jose Cueva DO  10/21/2024 10:01 PM EDT  Workstation ID: [id number]    XR Orbits 4+ View    Result Date: 10/21/2024  XR ORBITS 4+ VW Date of Exam: 10/21/2024 5:37 PM EDT Indication: MRI clearance Comparison: None available. Findings: No  metallic foreign bodies are seen in the orbits. No acute bony abnormalities.     Impression: Impression: No metallic foreign bodies are seen in the orbits. Electronically Signed: Micah Garrett DO  10/21/2024 6:06 PM EDT  Workstation ID: [id number]         Assessment & Plan   Assessment & Plan       Vertigo    Elevated glucose    Nausea & vomiting    Vertigo  Nausea /vomiting  -MRI reveals no acute intracranial abnormality. A few scattered periventricular and subcortical T2/FLAIR hyperintensities are nonspecific but most likely represent chronic small vessel ischemic changes.   -Scopolamine patch in ED  -Meclizine  -Zofran  -Echo  -EKG  -Consider vestibular rehab, outpatient      ADL below baseline  -PT /OT consult    Elevated glucose  -Glucose 144 in ED  -A1c  -CMP in a.m.    Hypothyroidism  -Not currently on replacement    Dyslipidemia  -Not currently on statin      DVT prophylaxis: Heparin    CODE STATUS:  Full        Expected Discharge    TBD      This note has been completed as part of a split-shared workflow.     Signature: Electronically signed by DREW Amor, 10/21/24, 10:39 PM EDT

## 2024-10-22 NOTE — PROGRESS NOTES
[facility] Medicine Services  PROGRESS NOTE    Patient Name: Kathleen Goetz  : 1950  MRN: [id number]    Date of Admission: 10/21/2024  Primary Care Physician: Rachele Paulino MD    Subjective   Subjective     CC:  vertigo    HPI:  Still feels dizziness with activity.  None while at rest.  Nausea better.      Objective   Objective     Vital Signs:   Temp:  [97.8 °F (36.6 °C)-99 °F (37.2 °C)] 98.3 °F (36.8 °C)  Heart Rate:  [49-73] 59  Resp:  [12-18] 18  BP: ()/(49-67) 136/64     Physical Exam:  Non toxic, in bed  MM moist  RRR  CTAB  Abd soft, NT  Alert, speech clear  Minimal horizontal and vertical nystagmus  Finger to nose with some difficulty bilaterally, performs heel to shin with relative ease.    Results Reviewed:  LAB RESULTS:      Lab 10/22/24  0410 10/21/24  1914 10/21/24  1602   WBC 10.25  --  8.45   HEMOGLOBIN 12.7  --  14.7   HEMATOCRIT 37.8  --  44.3   PLATELETS 174  --  225   NEUTROS ABS 8.74*  --  4.89   IMMATURE GRANS (ABS) 0.02  --  0.03   LYMPHS ABS 0.92  --  2.48   MONOS ABS 0.54  --  0.75   EOS ABS 0.01  --  0.24   MCV 90.2  --  90.2   LACTATE  --  1.7 2.6*         Lab 10/22/24  0410 10/21/24  1602   SODIUM 138 142   POTASSIUM 4.8 3.9   CHLORIDE 103 103   CO2 26.0 27.0   ANION GAP 9.0 12.0   BUN 13 14   CREATININE 0.86 0.88   EGFR 71.4 69.5   GLUCOSE 132* 144*   CALCIUM 9.0 10.1   MAGNESIUM  --  2.2   HEMOGLOBIN A1C  --  5.20         Lab 10/22/24  0410 10/21/24  1602   TOTAL PROTEIN 5.8* 7.1   ALBUMIN 3.8 4.4   GLOBULIN 2.0 2.7   ALT (SGPT) 13 18   AST (SGOT) 18 21   BILIRUBIN 0.2 0.2   ALK PHOS 57 68   LIPASE  --  35                     Brief Urine Lab Results  (Last result in the past 365 days)        Color   Clarity   Blood   Leuk Est   Nitrite   Protein   CREAT   Urine HCG        10/21/24 1857 Yellow   Clear   Negative   Trace   Negative   Negative                   Microbiology Results Abnormal       None            Adult Transthoracic Echo  Complete W/ Cont if Necessary Per Protocol    Result Date: 10/22/2024    Left ventricular systolic function is normal. Calculated left ventricular EF = 63.9% Left ventricular ejection fraction appears to be 61 - 65%.   Left ventricular diastolic function was normal.   The right atrial cavity is dilated.   RV size and function preserved.   Estimated right ventricular systolic pressure from tricuspid regurgitation is normal (<35 mmHg). Calculated right ventricular systolic pressure from tricuspid regurgitation is 22 mmHg.     MRI Brain Without Contrast    Result Date: 10/21/2024  MRI BRAIN WO CONTRAST Date of Exam: 10/21/2024 9:06 PM EDT Indication: acute vertigo weakness, ct [id number].  Comparison: None available. Technique:  Routine multiplanar/multisequence sequence images of the brain were obtained without contrast administration. Findings: No acute infarct or abnormal restricted diffusion. There is no evidence of hemorrhage. No mass effect, edema or midline shift Few scattered periventricular and subcortical T2/FLAIR hyperintensities are nonspecific. No extra-axial fluid collection. Prominent ventricular system secondary to chronic parenchymal volume loss. The visualized flow voids are unremarkable. Partially empty sella. Otherwise the midline structures are unremarkable. Status post bilateral lens extraction. Otherwise the orbits unremarkable. The visualized paranasal sinuses and mastoid air cells are clear. The visualized soft tissues are unremarkable. No acute osseous abnormality.     Impression: Impression: No acute intracranial abnormality. A few scattered periventricular and subcortical T2/FLAIR hyperintensities are nonspecific but most likely represent chronic small vessel ischemic changes. Electronically Signed: Jose Cueva DO  10/21/2024 10:01 PM EDT  Workstation ID: [id number]    XR Orbits 4+ View    Result Date: 10/21/2024  XR ORBITS 4+ VW Date of Exam: 10/21/2024 5:37 PM EDT Indication: MRI  clearance Comparison: None available. Findings: No metallic foreign bodies are seen in the orbits. No acute bony abnormalities.     Impression: Impression: No metallic foreign bodies are seen in the orbits. Electronically Signed: Micah DO Gerry  10/21/2024 6:06 PM EDT  Workstation ID: [id number]     Results for orders placed during the hospital encounter of 10/21/24    Adult Transthoracic Echo Complete W/ Cont if Necessary Per Protocol    Interpretation Summary    Left ventricular systolic function is normal. Calculated left ventricular EF = 63.9% Left ventricular ejection fraction appears to be 61 - 65%.    Left ventricular diastolic function was normal.    The right atrial cavity is dilated.    RV size and function preserved.    Estimated right ventricular systolic pressure from tricuspid regurgitation is normal (<35 mmHg). Calculated right ventricular systolic pressure from tricuspid regurgitation is 22 mmHg.      Current medications:  Scheduled Meds:acetaminophen, 1,000 mg, Oral, TID  cetirizine, 10 mg, Oral, Nightly  heparin (porcine), 5,000 Units, Subcutaneous, Q12H  Scopolamine, 1 patch, Transdermal, Once  sodium chloride, 10 mL, Intravenous, Q12H      Continuous Infusions:   PRN Meds:.  senna-docusate sodium **AND** polyethylene glycol **AND** bisacodyl **AND** bisacodyl    Calcium Replacement - Follow Nurse / BPA Driven Protocol    Magnesium Standard Dose Replacement - Follow Nurse / BPA Driven Protocol    meclizine    melatonin    ondansetron ODT **OR** ondansetron    Phosphorus Replacement - Follow Nurse / BPA Driven Protocol    Potassium Replacement - Follow Nurse / BPA Driven Protocol    Sodium Chloride (PF)    sodium chloride    Assessment & Plan   Assessment & Plan     Active Hospital Problems    Diagnosis  POA    **Vertigo [R42]  Yes    Elevated glucose [R73.09]  Yes    Nausea & vomiting [R11.2]  Yes      Resolved Hospital Problems   No resolved problems to display.        Brief Hospital Course to  date:  Kathleen Goetz is a 74 y.o. female with history of hypothyroidism and HLD who presents with new onset dizziness and N/V.    Vertigo  N/V  - MRI brain negative for acute ischemic event  - suspect BPV  - PT/OT for mobility needs and vestibular training  - consider Neurology evaluation if symptoms persist    Hypothyroid  - check TSH    HLD    Expected Discharge Location and Transportation: rehab vs home  Expected Discharge   Expected Discharge Date: 10/24/2024; Expected Discharge Time:      VTE Prophylaxis:  Pharmacologic VTE prophylaxis orders are present.         AM-PAC 6 Clicks Score (PT): 20 (10/22/24 0813)    CODE STATUS:   Code Status and Medical Interventions: CPR (Attempt to Resuscitate); Full Support   Ordered at: 10/21/24 7947     Level Of Support Discussed With:    Patient     Code Status (Patient has no pulse and is not breathing):    CPR (Attempt to Resuscitate)     Medical Interventions (Patient has pulse or is breathing):    Full Support       Gerry Cox MD  10/22/24

## 2024-10-22 NOTE — PLAN OF CARE
Goal Outcome Evaluation:  Plan of Care Reviewed With: patient        Progress: improving  Outcome Evaluation: Patient A+Ox4, has denied pain or SOA on room air.  C/o vertigo with any movement, Meclizine given with some improvement but continues with vertigo.  Requires assist of one with transfers/ambulation, gait unsteady.  VSS, NSR via the monitor.  Will continue with current POC.

## 2024-10-22 NOTE — THERAPY EVALUATION
"Patient Name: Kathleen Goetz  : 1950    MRN: [id number]                              Today's Date: 10/22/2024       Admit Date: 10/21/2024    Visit Dx:     ICD-10-CM ICD-9-CM   1. Vertigo  R42 780.4   2. Nausea and vomiting, unspecified vomiting type  R11.2 787.01     Patient Active Problem List   Diagnosis    Arthritis    Thumb pain    Annual physical exam    Family history of breast cancer    Family history of cancer    Vertigo    Elevated glucose    Nausea & vomiting     Past Medical History:   Diagnosis Date    Anemia     as a teenager    Anemia     Annual physical exam 2017    Arthritis 2015    Somewhat mitigated by wax baths, Aleve, braces    Cataract  or earlier    developing slowly    Colon polyp ?    Removed during colonoscopy    Foot fracture, left     Foot fracture, left     Heart murmur early     Hasn't caused any problems    Low back pain Early     No longer a problem    Pain of right thumb     Pneumonia Early     \"walking\" pneumonia     Past Surgical History:   Procedure Laterality Date    COLONOSCOPY      Every 5-6 years since age 41.  Last time ?    COSMETIC SURGERY      Eyelid surgery to mitigate vision impediment    EYE SURGERY  early 1980s    Removal of metal fragment      General Information       Row Name 10/22/24 1149          OT Time and Intention    Document Type evaluation  -     Mode of Treatment occupational therapy;individual therapy  -       Row Name 10/22/24 1149          General Information    Patient Profile Reviewed yes  -     Prior Level of Function independent:;bed mobility;transfer;all household mobility;community mobility;ADL's;driving;shopping;cooking;cleaning;home management  -     Existing Precautions/Restrictions fall;other (see comments)  vertigo (significant dizziness w/ position changes)  -     Barriers to Rehab medically complex  -       Row Name 10/22/24 1149          Living Environment    People in Home alone  " -       Row Name 10/22/24 1149          Home Main Entrance    Number of Stairs, Main Entrance two  -     Stair Railings, Main Entrance none  -       Row Name 10/22/24 1149          Stairs Within Home, Primary    Stairs, Within Home, Primary Pt reports bedrooms all on 2nd level of home. Pt reports a full bathroom on main floor so she could sleep on couch if needed  -     Number of Stairs, Within Home, Primary twelve  -       Row Name 10/22/24 1149          Cognition    Orientation Status (Cognition) oriented x 3  -       Row Name 10/22/24 1149          Safety Issues/Impairments Affecting Functional Mobility    Safety Issues Affecting Function (Mobility) awareness of need for assistance;insight into deficits/self-awareness;safety precaution awareness;safety precautions follow-through/compliance;sequencing abilities  -     Impairments Affecting Function (Mobility) balance;endurance/activity tolerance;strength  -               User Key  (r) = Recorded By, (t) = Taken By, (c) = Cosigned By      Initials Name Provider Type     Alivia Horn OT Occupational Therapist                     Mobility/ADL's       Row Name 10/22/24 1155          Bed Mobility    Bed Mobility supine-sit  -     Supine-Sit [place] (Bed Mobility) standby assist  -     Assistive Device (Bed Mobility) bed rails;head of bed elevated  -       Row Name 10/22/24 1155          Transfers    Transfers sit-stand transfer;stand-sit transfer;bed-chair transfer  -       Row Name 10/22/24 1155          Bed-Chair Transfer    Bed-Chair [place] (Transfers) moderate assist (50% patient effort);verbal cues  -     Assistive Device (Bed-Chair Transfers) other (see comments)  UE support  -       Row Name 10/22/24 1155          Sit-Stand Transfer    Sit-Stand [place] (Transfers) minimum assist (75% patient effort);verbal cues  -       Row Name 10/22/24 1155          Stand-Sit Transfer    Stand-Sit [place] (Transfers)  minimum assist (75% patient effort);verbal cues  -       Row Name 10/22/24 1155          Activities of Daily Living    BADL Assessment/Intervention lower body dressing;upper body dressing  -[facility] Name 10/22/24 1155          Lower Body Dressing Assessment/Training    [place] Level (Lower Body Dressing) don;socks;standby assist  -     Position (Lower Body Dressing) supported sitting  -     Comment, (Lower Body Dressing) Pt able to reach B feet performing cross-leg technique. Pt educated to perform LBD w/ this technique to minimize bending and dizziness w/ position changes  -       Row Name 10/22/24 1155          Upper Body Dressing Assessment/Training    [place] Level (Upper Body Dressing) don;pajama/robe;minimum assist (75% patient effort)  -     Position (Upper Body Dressing) edge of bed sitting  -               User Key  (r) = Recorded By, (t) = Taken By, (c) = Cosigned By      Initials Name Provider Type     Alivia Horn OT Occupational Therapist                   Obj/Interventions       [facility] Name 10/22/24 1158          Sensory Assessment (Somatosensory)    Sensory Assessment (Somatosensory) UE sensation intact  -[facility] 10/22/24 1158          Vision Assessment/Intervention    Visual Impairment/Limitations WFL  -[facility] Name 10/22/24 1158          Range of Motion Comprehensive    General Range of Motion bilateral upper extremity ROM WFL  -[facility] Name 10/22/24 1158          Strength Comprehensive (MMT)    General Manual Muscle Testing (MMT) Assessment upper extremity strength deficits identified  -     Comment, General Manual Muscle Testing (MMT) Assessment BUE grossly 4+/5  -[facility] Name 10/22/24 1158          Balance    Balance Assessment sitting static balance;sitting dynamic balance;sit to stand dynamic balance;standing static balance;standing dynamic balance  -     Static Sitting Balance standby assist  -     Dynamic Sitting Balance standby  assist  -     Position, Sitting Balance unsupported;sitting edge of bed;sitting in chair  -     Sit to Stand Dynamic Balance minimal assist;verbal cues  -     Static Standing Balance minimal assist;verbal cues  -     Dynamic Standing Balance moderate assist;verbal cues  -     Position/Device Used, Standing Balance supported  -     Balance Interventions sitting;sit to stand;occupation based/functional task  -               User Key  (r) = Recorded By, (t) = Taken By, (c) = Cosigned By      Initials Name Provider Type     Alivia Horn OT Occupational Therapist                   Goals/Plan       Row Name 10/22/24 1455          Transfer Goal 1 (OT)    Activity/Assistive Device (Transfer Goal 1, OT) bed-to-chair/chair-to-bed;toilet;walker, rolling  -     [place] Level/Cues Needed (Transfer Goal 1, OT) standby assist  -     Time Frame (Transfer Goal 1, OT) long term goal (LTG);10 days  -     Progress/Outcome (Transfer Goal 1, OT) goal ongoing  -       Row Name 10/22/24 7088          Toileting Goal 1 (OT)    Activity/Device (Toileting Goal 1, OT) adjust/manage clothing;perform perineal hygiene;commode;grab bar/safety frame  -     [place] Level/Cues Needed (Toileting Goal 1, OT) standby assist  -     Time Frame (Toileting Goal 1, OT) short term goal (STG);5 days  -     Progress/Outcome (Toileting Goal 1, OT) goal ongoing  -       Row Name 10/22/24 3386          Grooming Goal 1 (OT)    Activity/Device (Grooming Goal 1, OT) hair care;oral care;wash face, hands  standing sinkside  -     [place] (Grooming Goal 1, OT) standby assist  -     Time Frame (Grooming Goal 1, OT) long term goal (LTG);10 days  -     Progress/Outcome (Grooming Goal 1, OT) goal ongoing  -       Row Name 10/22/24 8228          Therapy Assessment/Plan (OT)    Planned Therapy Interventions (OT) activity tolerance training;adaptive equipment training;BADL retraining;functional balance  retraining;IADL retraining;occupation/activity based interventions;patient/caregiver education/training;ROM/therapeutic exercise;strengthening exercise;transfer/mobility retraining  -               User Key  (r) = Recorded By, (t) = Taken By, (c) = Cosigned By      Initials Name Provider Type     Alivia Horn, GELNDY Occupational Therapist                   Clinical Impression       Row Name 10/22/24 1159          Pain Assessment    Pretreatment Pain Rating 0/10 - no pain  -     Posttreatment Pain Rating 0/10 - no pain  -       Row Name 10/22/24 1157          Plan of Care Review    Plan of Care Reviewed With patient  -     Outcome Evaluation Pt presents w/ dizziness, decreased activity tolerance, and balance deficits limiting her ADL independence. Pt would benefit from continued skilled IPOT services to address current functional deficits. Rec IRF at d/c for best functional outcomes.  -       Row Name 10/22/24 1154          Therapy Assessment/Plan (OT)    Patient/Family Therapy Goal Statement (OT) Return to [facility]  -     Rehab Potential (OT) good  -     Criteria for Skilled Therapeutic Interventions Met (OT) yes;skilled treatment is necessary  -     Therapy Frequency (OT) daily  -     Predicted Duration of Therapy Intervention (OT) 10 days  -       Row Name 10/22/24 1152          Therapy Plan Review/Discharge Plan (OT)    Anticipated Discharge Disposition (OT) inpatient rehabilitation facility  -       Row Name 10/22/24 115          Vital Signs    Pre Systolic BP Rehab 127  -MC     Pre Treatment Diastolic BP 56  -MC     Intra Systolic BP Rehab 123  -MC     Intra Treatment Diastolic BP 64  -MC     Pretreatment Heart Rate (beats/min) 57  -MC     Intratreatment Heart Rate (beats/min) 50  -MC     O2 Delivery Pre Treatment room air  -     O2 Delivery Intra Treatment room air  -     O2 Delivery Post Treatment room air  -     Pre Patient Position Supine  -MC     Intra Patient Position  Standing  -     Post Patient Position Sitting  -       Row Name 10/22/24 1159          Positioning and Restraints    Pre-Treatment Position in bed  -     Post Treatment Position chair  -     In Chair notified nsg;reclined;call light within reach;encouraged to call for assist;exit alarm on;waffle cushion;legs elevated  -               User Key  (r) = Recorded By, (t) = Taken By, (c) = Cosigned By      Initials Name Provider Type    Alivia Latham OT Occupational Therapist                   Outcome Measures       Row Name 10/22/24 1452          How much help from another is currently needed...    Putting on and taking off regular lower body clothing? 2  -MC     Bathing (including washing, rinsing, and drying) 2  -MC     Toileting (which includes using toilet bed pan or urinal) 2  -MC     Putting on and taking off regular upper body clothing 3  -MC     Taking care of personal grooming (such as brushing teeth) 3  -MC     Eating meals 3  -     AM-[facility] 6 Clicks Score (OT) 15  -       Row Name 10/22/24 0813          How much help from another person do you currently need...    Turning from your back to your side while in flat bed without using bedrails? 4  -CG     Moving from lying on back to sitting on the side of a flat bed without bedrails? 4  -CG     Moving to and from a bed to a chair (including a wheelchair)? 3  -CG     Standing up from a chair using your arms (e.g., wheelchair, bedside chair)? 3  -CG     Climbing 3-5 steps with a railing? 3  -CG     To walk in hospital room? 3  -CG     AM-[facility] 6 Clicks Score (PT) 20  -       Row Name 10/22/24 1742          Functional Assessment    Outcome Measure Options AM-PAC 6 Clicks Daily Activity (OT)  -               User Key  (r) = Recorded By, (t) = Taken By, (c) = Cosigned By      Initials Name Provider Type    Manuelito Clarke RN Registered Nurse    Alivia Latham OT Occupational Therapist                    Occupational Therapy  Education       Title: PT OT SLP Therapies (In Progress)       Topic: Occupational Therapy (In Progress)       Point: ADL training (Done)       Description:   Instruct learner(s) on proper safety adaptation and remediation techniques during self care or transfers.   Instruct in proper use of assistive devices.                  Learning Progress Summary            Patient Acceptance, E, VU by  at 10/22/2024 1458                      Point: Home exercise program (Not Started)       Description:   Instruct learner(s) on appropriate technique for monitoring, assisting and/or progressing therapeutic exercises/activities.                  Learner Progress:  Not documented in this visit.              Point: Precautions (Done)       Description:   Instruct learner(s) on prescribed precautions during self-care and functional transfers.                  Learning Progress Summary            Patient Acceptance, E, VU by  at 10/22/2024 8110                      Point: Body mechanics (Done)       Description:   Instruct learner(s) on proper positioning and spine alignment during self-care, functional mobility activities and/or exercises.                  Learning Progress Summary            Patient Acceptance, E, VU by  at 10/22/2024 1450                                      User Key       Initials Effective Dates Name Provider Type Discipline     10/14/22 -  Alivia Horn OT Occupational Therapist OT                  OT Recommendation and Plan  Planned Therapy Interventions (OT): activity tolerance training, adaptive equipment training, BADL retraining, functional balance retraining, IADL retraining, occupation/activity based interventions, patient/caregiver education/training, ROM/therapeutic exercise, strengthening exercise, transfer/mobility retraining  Therapy Frequency (OT): daily  Plan of Care Review  Plan of Care Reviewed With: patient  Outcome Evaluation: Pt presents w/ dizziness, decreased activity  tolerance, and balance deficits limiting her ADL independence. Pt would benefit from continued skilled IPOT services to address current functional deficits. Rec IRF at d/c for best functional outcomes.     Time Calculation:   Evaluation Complexity (OT)  Review Occupational Profile/Medical/Therapy History Complexity: expanded/moderate complexity  Assessment, Occupational Performance/Identification of Deficit Complexity: 3-5 performance deficits  Clinical Decision Making Complexity (OT): detailed assessment/moderate complexity  Overall Complexity of Evaluation (OT): moderate complexity     Time Calculation- OT       Row Name 10/22/24 1501             Time Calculation- OT    OT Start Time 1122  -MC      OT Received On 10/22/24  -      OT Goal Re-Cert Due Date 11/01/24  -         Timed Charges    75217 - OT Therapeutic Activity Minutes 9  -MC      39719 - OT Self Care/Mgmt Minutes 5  -MC         Untimed Charges    OT Eval/Re-eval Minutes 31  -MC         Total Minutes    Timed Charges Total Minutes 14  -MC      Untimed Charges Total Minutes 31  -MC       Total Minutes 45  -MC                User Key  (r) = Recorded By, (t) = Taken By, (c) = Cosigned By      Initials Name Provider Type     Alivia Horn, OT Occupational Therapist                  Therapy Charges for Today       Code Description Service Date Service Provider Modifiers Qty    50916685515  OT THERAPEUTIC ACT EA 15 MIN 10/22/2024 Alivia Horn OT GO 1    88166909891 HC OT EVAL MOD COMPLEXITY 3 10/22/2024 Alivia Horn OT GO 1                 Alivia Horn OT  10/22/2024

## 2024-10-22 NOTE — CASE MANAGEMENT/SOCIAL WORK
Discharge Planning Assessment  [facility]     Patient Name: Kathleen Goetz  MRN: [id number]  Today's Date: 10/22/2024    Admit Date: 10/21/2024    Plan: discharge plan   Discharge Needs Assessment       Row Name 10/22/24 1418       Living Environment    People in Home alone    Current Living Arrangements home    Primary Care Provided by self    Provides Primary Care For no one    Family Caregiver if Needed friend(s)    Family Caregiver Names Cecile Almanzar(friend)    Quality of Family Relationships helpful;supportive    Able to Return to Prior Arrangements yes    Living Arrangement Comments I met with pt at bedside with permission regarding discharge plan. Pt resides in [facility] in a home alone.       Transition Planning    Patient/Family Anticipates Transition to home    Patient/Family Anticipated Services at Transition     Transportation Anticipated family or friend will provide       Discharge Needs Assessment    Readmission Within the Last 30 Days no previous admission in last 30 days    Equipment Currently Used at Home none    Concerns to be Addressed discharge planning    Equipment Needed After Discharge none    Discharge Coordination/Progress Pt confirms that she has Medicare and [employer] and uses [employer] for prescription coverage. Pt uses [facility] on [address] Pt reports she is independent with ADLs and uses no DME or HH. Pt is here with dizziness, n/v and diagnosis of vertigo. Plan is home and reports she has [employer] friends that can help her. Pt currently denies discharge needs. CM will cont to follow                   Discharge Plan       Row Name 10/22/24 4863       Plan    Plan discharge plan    Plan Comments . Pt is here with dizziness, n/v and diagnosis of vertigo. Plan is home and reports she has [employer] friends that can help her. Pt currently denies discharge needs. CM will cont to follow    Final Discharge Disposition Code 01 - home or self-care                   Continued Care and Services - Admitted Since 10/21/2024    No active coordination exists for this encounter.          Demographic Summary       Row Name 10/22/24 1415       General Information    General Information Comments PCP is ZURI GONZALEZ       Contact Information    Permission Granted to Share Info With     Contact Information Obtained for     Contact Information Comments Cecile Almanzar(friend) [phone]                   Functional Status       Row Name 10/22/24 1416       Functional Status    Functional Status Comments Reports she is independent with ADLs                   Psychosocial    No documentation.                  Abuse/Neglect    No documentation.                  Legal    No documentation.                  Substance Abuse    No documentation.                  Patient Forms    No documentation.                     Polina Simon RN

## 2024-10-22 NOTE — PLAN OF CARE
Goal Outcome Evaluation:  Plan of Care Reviewed With: patient           Outcome Evaluation: Pt presents w/ dizziness, decreased activity tolerance, and balance deficits limiting her ADL independence. Pt would benefit from continued skilled IPOT services to address current functional deficits. Rec IRF at d/c for best functional outcomes.    Anticipated Discharge Disposition (OT): inpatient rehabilitation facility

## 2024-10-22 NOTE — ED NOTES
" Kathleen Goetz    Nursing Report ED to Floor:  Mental status: A&O x 4  Ambulatory status: up with assist   Oxygen Therapy:  room air   Cardiac Rhythm: sinus bradycardia  Admitted from: ED  Safety Concerns:  none   Social Issues: none   ED Room #:  33    ED Nurse Phone Extension - [phone] or may call [phone].      HPI:   Chief Complaint   Patient presents with    Nausea    Vomiting    Dizziness       Past Medical History:  Past Medical History:   Diagnosis Date    Anemia     as a teenager    Anemia     Annual physical exam 05/09/2017    Arthritis 2015    Somewhat mitigated by wax baths, Aleve, braces    Cataract 2018 or earlier    developing slowly    Colon polyp 2009?    Removed during colonoscopy    Foot fracture, left     Foot fracture, left     Heart murmur early 1980s    Hasn't caused any problems    Low back pain Early 2000s    No longer a problem    Pain of right thumb     Pneumonia Early 2000s    \"walking\" pneumonia        Past Surgical History:  Past Surgical History:   Procedure Laterality Date    COLONOSCOPY      Every 5-6 years since age 41.  Last time 2014?    COSMETIC SURGERY  2020    Eyelid surgery to mitigate vision impediment    EYE SURGERY  early 1980s    Removal of metal fragment        Admitting Doctor:   Jill Turner MD    Consulting Provider(s):  Consults       No orders found from 9/22/2024 to 10/22/2024.             Admitting Diagnosis:   The primary encounter diagnosis was Vertigo. A diagnosis of Nausea and vomiting, unspecified vomiting type was also pertinent to this visit.    Most Recent Vitals:   Vitals:    10/21/24 1730 10/21/24 1830 10/21/24 2000 10/21/24 2030   BP: 127/54 124/59 137/63 127/55   Pulse: 57 52 61 57   Resp:       Temp:       TempSrc:       SpO2: 94% 95% 96% 93%   Weight:       Height:           Active LDAs/IV Access:   Lines, Drains & Airways       Active LDAs       Name Placement date Placement time Site Days    Peripheral IV 10/21/24 1603 Right Antecubital 10/21/24  " 1603  Antecubital  less than 1                    Labs (abnormal labs have a star):   Labs Reviewed   COMPREHENSIVE METABOLIC PANEL - Abnormal; Notable for the following components:       Result Value    Glucose 144 (*)     All other components within normal limits    Narrative:     GFR Normal >60  Chronic Kidney Disease <60  Kidney Failure <15    The GFR formula is only valid for adults with stable renal function between ages 18 and 70.   URINALYSIS W/ MICROSCOPIC IF INDICATED (NO CULTURE) - Abnormal; Notable for the following components:    Ketones, UA 15 mg/dL (1+) (*)     Leuk Esterase, UA Trace (*)     All other components within normal limits   LACTIC ACID, PLASMA - Abnormal; Notable for the following components:    Lactate 2.6 (*)     All other components within normal limits   LIPASE - Normal   CBC WITH AUTO DIFFERENTIAL - Normal   LACTIC ACID, REFLEX - Normal   RAINBOW DRAW    Narrative:     The following orders were created for panel order [place] Draw.  Procedure                               Abnormality         Status                     ---------                               -----------         ------                     Green Top (Gel)[[id number]]                                  Final result               Lavender Top[[id number]]                                     Final result               Gold Top - SST[[id number]]                                   Final result               Heath Top[[id number]]                                         Final result               Light Blue Top[[id number]]                                   Final result                 Please view results for these tests on the individual orders.   URINALYSIS, MICROSCOPIC ONLY   CBC AND DIFFERENTIAL    Narrative:     The following orders were created for panel order CBC & Differential.  Procedure                               Abnormality         Status                     ---------                               -----------         ------                      CBC Auto Differential[[id number]]        Normal              Final result                 Please view results for these tests on the individual orders.   GREEN TOP   LAVENDER TOP   GOLD TOP - SST   GRAY TOP   LIGHT BLUE TOP       Meds Given in ED:   Medications   Sodium Chloride (PF) 0.9 % 10 mL (has no administration in time range)   scopolamine patch 1 mg/72 hr (has no administration in time range)   midazolam (VERSED) injection 1 mg (1 mg Intravenous Not Given 10/21/24 2120)   sodium chloride 0.9 % bolus 1,000 mL (0 mL Intravenous Stopped 10/21/24 1857)   ondansetron (ZOFRAN) injection 4 mg (4 mg Intravenous Given 10/21/24 1707)   meclizine (ANTIVERT) tablet 25 mg (25 mg Oral Given 10/21/24 1911)           Last NIH score:                                                          Dysphagia screening results:  Patient Factors Component (Dysphagia:Stroke or Rule-out)  Best Eye Response: 4-->(E4) spontaneous (10/21/24 1907)  Best Motor Response: 6-->(M6) obeys commands (10/21/24 1907)  Best Verbal Response: 5-->(V5) oriented (10/21/24 1907)  [place] Coma Scale Score: 15 (10/21/24 1907)  Is there Facial Asymmetry/Weakness?: No (10/21/24 1907)  Is there Tongue Asymmetry/Weakness?: No (10/21/24 1907)  Is there Palatal Asymmetry/Weakness?: No (10/21/24 1907)  Patient Assessment Result: Pass - Proceed to Water Test (10/21/24 1907)     [place] Coma Scale:  No data recorded     CIWA:        Restraint Type:            Isolation Status:  No active isolations

## 2024-10-23 PROCEDURE — 25010000002 HEPARIN (PORCINE) PER 1000 UNITS

## 2024-10-23 PROCEDURE — 99231 SBSQ HOSP IP/OBS SF/LOW 25: CPT | Performed by: STUDENT IN AN ORGANIZED HEALTH CARE EDUCATION/TRAINING PROGRAM

## 2024-10-23 PROCEDURE — 95992 CANALITH REPOSITIONING PROC: CPT

## 2024-10-23 PROCEDURE — 97162 PT EVAL MOD COMPLEX 30 MIN: CPT

## 2024-10-23 RX ADMIN — MECLIZINE HYDROCHLORIDE 12.5 MG: 25 TABLET ORAL at 08:11

## 2024-10-23 RX ADMIN — HEPARIN SODIUM 5000 UNITS: 5000 INJECTION INTRAVENOUS; SUBCUTANEOUS at 21:32

## 2024-10-23 RX ADMIN — HEPARIN SODIUM 5000 UNITS: 5000 INJECTION INTRAVENOUS; SUBCUTANEOUS at 08:11

## 2024-10-23 RX ADMIN — Medication 10 ML: at 08:12

## 2024-10-23 RX ADMIN — Medication 10 ML: at 21:35

## 2024-10-23 RX ADMIN — CETIRIZINE HYDROCHLORIDE 10 MG: 10 TABLET, FILM COATED ORAL at 21:32

## 2024-10-23 RX ADMIN — ACETAMINOPHEN 1000 MG: 500 TABLET ORAL at 08:11

## 2024-10-23 RX ADMIN — MECLIZINE HYDROCHLORIDE 12.5 MG: 25 TABLET ORAL at 21:32

## 2024-10-23 NOTE — PLAN OF CARE
Goal Outcome Evaluation:  Plan of Care Reviewed With: patient        Progress: improving  Outcome Evaluation: Patient A+Ox4, Has denied pain or SOA on room air.  States she is still having Vertigo but is improved, gait is off and requires assist of one with all ambulation.  VSS, SB via the monitor.  .  Will continue with current POC.

## 2024-10-23 NOTE — PLAN OF CARE
Goal Outcome Evaluation:  Plan of Care Reviewed With: patient           Outcome Evaluation: PT eval completed. Pt presents below baseline function d/t dizziness, gait instability, and decreased activity tolerance. Performed David Hallpike to the R and mild upbeating torsional nystagmus noted w/ mild symptoms reported by pt. Epley for the R side performed for treatment of BPPV. Pt reported diplopia w/ cervical extension and head turned to the L during Epley. Pt then ambulated 10+25 ft w/ FWW, Felipe x1. She was moderately unstable when ambulating w/ 4 instances of LOB noted that required CGA to Felipe to correct. Pt would benefit from skilled IP PT. Recommend IRF at d/c.    Anticipated Discharge Disposition (PT): inpatient rehabilitation facility

## 2024-10-23 NOTE — CASE MANAGEMENT/SOCIAL WORK
Continued Stay Note   Hal     Patient Name: Kathleen Goetz  MRN: [id number]  Today's Date: 10/23/2024    Admit Date: 10/21/2024    Plan: discharge plan   Discharge Plan       Row Name 10/23/24 1628       Plan    Plan discharge plan    Plan Comments I met with pt in room regarding discharge plan and PT/OT recommendations. Pt provided a list of rehab facilities from Patient [facility] if pt decides to go to rehab. CM will follow up tomorrow.    Final Discharge Disposition Code 03 - skilled nursing facility (SNF)                   Discharge Codes    No documentation.                 Expected Discharge Date and Time       Expected Discharge Date Expected Discharge Time    Oct 24, 2024               Polina Simon RN

## 2024-10-23 NOTE — PROGRESS NOTES
[facility] Medicine Services  PROGRESS NOTE    Patient Name: Kathleen Goetz  : 1950  MRN: [id number]    Date of Admission: 10/21/2024  Primary Care Physician: Rachele Paulino MD    Subjective   Subjective     CC:  Dizziness     HPI:  Patient reports improvement but not full resolution of dizziness. Denies focal numbness/weakness.       Objective   Objective     Vital Signs:   Temp:  [97.9 °F (36.6 °C)-98.4 °F (36.9 °C)] 98.1 °F (36.7 °C)  Heart Rate:  [42-67] 50  Resp:  [18] 18  BP: (129-147)/(60-81) 145/63     Physical Exam:  General appearance: alert, awake, oriented, no acute distress   Cardiovascular: RRR, no murmurs or rubs, radial pulse full 2/4 BL   Respiratory: CTAB, no crackles or wheezes   Neuro/CNS: alert and oriented x3, normal speech, mild horizontal nystagmus, no focal motor or sensory deficits     Results Reviewed:  LAB RESULTS:      Lab 10/22/24  0410 10/21/24  1914 10/21/24  1602   WBC 10.25  --  8.45   HEMOGLOBIN 12.7  --  14.7   HEMATOCRIT 37.8  --  44.3   PLATELETS 174  --  225   NEUTROS ABS 8.74*  --  4.89   IMMATURE GRANS (ABS) 0.02  --  0.03   LYMPHS ABS 0.92  --  2.48   MONOS ABS 0.54  --  0.75   EOS ABS 0.01  --  0.24   MCV 90.2  --  90.2   LACTATE  --  1.7 2.6*         Lab 10/22/24  0410 10/21/24  1602   SODIUM 138 142   POTASSIUM 4.8 3.9   CHLORIDE 103 103   CO2 26.0 27.0   ANION GAP 9.0 12.0   BUN 13 14   CREATININE 0.86 0.88   EGFR 71.4 69.5   GLUCOSE 132* 144*   CALCIUM 9.0 10.1   MAGNESIUM  --  2.2   HEMOGLOBIN A1C  --  5.20   TSH 1.230  --          Lab 10/22/24  0410 10/21/24  1602   TOTAL PROTEIN 5.8* 7.1   ALBUMIN 3.8 4.4   GLOBULIN 2.0 2.7   ALT (SGPT) 13 18   AST (SGOT) 18 21   BILIRUBIN 0.2 0.2   ALK PHOS 57 68   LIPASE  --  35                     Brief Urine Lab Results  (Last result in the past 365 days)        Color   Clarity   Blood   Leuk Est   Nitrite   Protein   CREAT   Urine HCG        10/21/24 1857 Yellow   Clear   Negative    Trace   Negative   Negative                   Microbiology Results Abnormal       None            Adult Transthoracic Echo Complete W/ Cont if Necessary Per Protocol    Result Date: 10/22/2024    Left ventricular systolic function is normal. Calculated left ventricular EF = 63.9% Left ventricular ejection fraction appears to be 61 - 65%.   Left ventricular diastolic function was normal.   The right atrial cavity is dilated.   RV size and function preserved.   Estimated right ventricular systolic pressure from tricuspid regurgitation is normal (<35 mmHg). Calculated right ventricular systolic pressure from tricuspid regurgitation is 22 mmHg.     MRI Brain Without Contrast    Result Date: 10/21/2024  MRI BRAIN WO CONTRAST Date of Exam: 10/21/2024 9:06 PM EDT Indication: acute vertigo weakness, ct [id number].  Comparison: None available. Technique:  Routine multiplanar/multisequence sequence images of the brain were obtained without contrast administration. Findings: No acute infarct or abnormal restricted diffusion. There is no evidence of hemorrhage. No mass effect, edema or midline shift Few scattered periventricular and subcortical T2/FLAIR hyperintensities are nonspecific. No extra-axial fluid collection. Prominent ventricular system secondary to chronic parenchymal volume loss. The visualized flow voids are unremarkable. Partially empty sella. Otherwise the midline structures are unremarkable. Status post bilateral lens extraction. Otherwise the orbits unremarkable. The visualized paranasal sinuses and mastoid air cells are clear. The visualized soft tissues are unremarkable. No acute osseous abnormality.     Impression: Impression: No acute intracranial abnormality. A few scattered periventricular and subcortical T2/FLAIR hyperintensities are nonspecific but most likely represent chronic small vessel ischemic changes. Electronically Signed: Jose Cueva DO  10/21/2024 10:01 PM EDT  Workstation ID:  [id number]    XR Orbits 4+ View    Result Date: 10/21/2024  XR ORBITS 4+ VW Date of Exam: 10/21/2024 5:37 PM EDT Indication: MRI clearance Comparison: None available. Findings: No metallic foreign bodies are seen in the orbits. No acute bony abnormalities.     Impression: Impression: No metallic foreign bodies are seen in the orbits. Electronically Signed: Micahzaina Garrett,   10/21/2024 6:06 PM EDT  Workstation ID: [id number]     Results for orders placed during the hospital encounter of 10/21/24    Adult Transthoracic Echo Complete W/ Cont if Necessary Per Protocol    Interpretation Summary    Left ventricular systolic function is normal. Calculated left ventricular EF = 63.9% Left ventricular ejection fraction appears to be 61 - 65%.    Left ventricular diastolic function was normal.    The right atrial cavity is dilated.    RV size and function preserved.    Estimated right ventricular systolic pressure from tricuspid regurgitation is normal (<35 mmHg). Calculated right ventricular systolic pressure from tricuspid regurgitation is 22 mmHg.      Current medications:  Scheduled Meds:cetirizine, 10 mg, Oral, Nightly  heparin (porcine), 5,000 Units, Subcutaneous, Q12H  Scopolamine, 1 patch, Transdermal, Once  sodium chloride, 10 mL, Intravenous, Q12H      Continuous Infusions:   PRN Meds:.  senna-docusate sodium **AND** polyethylene glycol **AND** bisacodyl **AND** bisacodyl    Calcium Replacement - Follow Nurse / BPA Driven Protocol    Magnesium Standard Dose Replacement - Follow Nurse / BPA Driven Protocol    meclizine    melatonin    ondansetron ODT **OR** ondansetron    Phosphorus Replacement - Follow Nurse / BPA Driven Protocol    Potassium Replacement - Follow Nurse / BPA Driven Protocol    Sodium Chloride (PF)    sodium chloride    Assessment & Plan   Assessment & Plan     Active Hospital Problems    Diagnosis  POA    **Vertigo [R42]  Yes    Elevated glucose [R73.09]  Yes    Nausea & vomiting [R11.2]  Yes       Resolved Hospital Problems   No resolved problems to display.        Brief Hospital Course to date:  Kathleen Goetz is a 74 y.o. female who was admitted for evaluation of vertigo symptoms.    BPPV  -MRI brain negative for acute stroke  -[place]-Hallpike positive  -Continue vestibular rehab with Epley maneuver and meclizine as needed      Expected Discharge Location and Transportation: Home    Expected Discharge   Expected Discharge Date: 10/24/2024; Expected Discharge Time:      VTE Prophylaxis:  Pharmacologic VTE prophylaxis orders are present.         AM-PAC 6 Clicks Score (PT): 17 (10/23/24 0960)    CODE STATUS:   Code Status and Medical Interventions: CPR (Attempt to Resuscitate); Full Support   Ordered at: 10/21/24 0676     Level Of Support Discussed With:    Patient     Code Status (Patient has no pulse and is not breathing):    CPR (Attempt to Resuscitate)     Medical Interventions (Patient has pulse or is breathing):    Full Support       Kel Max, DO  10/23/24

## 2024-10-23 NOTE — THERAPY EVALUATION
"Patient Name: Kathleen Goetz  : 1950    MRN: [id number]                              Today's Date: 10/23/2024       Admit Date: 10/21/2024    Visit Dx:     ICD-10-CM ICD-9-CM   1. Vertigo  R42 780.4   2. Nausea and vomiting, unspecified vomiting type  R11.2 787.01     Patient Active Problem List   Diagnosis    Arthritis    Thumb pain    Annual physical exam    Family history of breast cancer    Family history of cancer    Vertigo    Elevated glucose    Nausea & vomiting     Past Medical History:   Diagnosis Date    Anemia     as a teenager    Anemia     Annual physical exam 2017    Arthritis 2015    Somewhat mitigated by wax baths, Aleve, braces    Cataract  or earlier    developing slowly    Colon polyp ?    Removed during colonoscopy    Foot fracture, left     Foot fracture, left     Heart murmur early     Hasn't caused any problems    Low back pain Early     No longer a problem    Pain of right thumb     Pneumonia Early     \"walking\" pneumonia     Past Surgical History:   Procedure Laterality Date    COLONOSCOPY      Every 5-6 years since age 41.  Last time ?    COSMETIC SURGERY      Eyelid surgery to mitigate vision impediment    EYE SURGERY  early 1980s    Removal of metal fragment      General Information       Row Name 10/23/24 0909          Physical Therapy Time and Intention    Document Type evaluation  -     Mode of Treatment physical therapy  -       Row Name 10/23/24 0909          General Information    Patient Profile Reviewed yes  -     Prior Level of Function independent:;all household mobility;community mobility;gait;transfer;bed mobility;ADL's;dressing;bathing;home management;cooking;cleaning;driving;shopping  No AD use at baseline. Has rollator in room w/o brakes. Denies falls  -     Existing Precautions/Restrictions fall;other (see comments)  vertigo (significant dizziness w/ position changes)  -     Barriers to Rehab medically complex  " -       Row Name 10/23/24 0909          Living Environment    People in Home alone  -       Row Name 10/23/24 0909          Home Main Entrance    Number of Stairs, Main Entrance two  -     Stair Railings, Main Entrance none  -       Row Name 10/23/24 0909          Stairs Within Home, Primary    Stairs, Within Home, Primary Can stay on first floor if needed. All bedrooms on 2nd level  -     Number of Stairs, Within Home, Primary twelve  -       Row Name 10/23/24 0909          Cognition    Orientation Status (Cognition) oriented x 3  -       Row Name 10/23/24 0909          Safety Issues/Impairments Affecting Functional Mobility    Safety Issues Affecting Function (Mobility) awareness of need for assistance;insight into deficits/self-awareness;safety precaution awareness;safety precautions follow-through/compliance;sequencing abilities;judgment;positioning of assistive device  -     Impairments Affecting Function (Mobility) balance;endurance/activity tolerance;strength;other (see comments)  -               User Key  (r) = Recorded By, (t) = Taken By, (c) = Cosigned By      Initials Name Provider Type     Darcy Trujillo, PT Physical Therapist                   Mobility       Row Name 10/23/24 0911          Bed Mobility    Bed Mobility supine-sit;sit-supine  -     Supine-Sit [place] (Bed Mobility) standby assist  -     Sit-Supine [place] (Bed Mobility) contact guard  -     Assistive Device (Bed Mobility) bed rails  -     Comment, (Bed Mobility) VCs for hand placement and sequencing. Performed David Hallpike to the R and mild upbeating torsional nystagmus noted w/ mild symptoms reported by pt. Epley for the R side performed for treatment of BPPV. Pt reported diplopia w/ cervical extension and head turned to the L during Epley. Pt reported mild symptoms at end of treatment  -       Row Name 10/23/24 0911          Transfers    Comment, (Transfers) VCs for hand placement and sequencing  w/ FWW. Mild instability noted in standing that pt was able to self correct. Pt has rollator in room however used FWW d/t no brakes  -       Row Name 10/23/24 0911          Sit-Stand Transfer    Sit-Stand [place] (Transfers) contact guard;verbal cues  -     Assistive Device (Sit-Stand Transfers) walker, front-wheeled  -     Comment, (Sit-Stand Transfer) STS from EOB and toilet  -       Row Name 10/23/24 0911          Gait/Stairs (Locomotion)    [place] Level (Gait) minimum assist (75% patient effort);verbal cues  -     Assistive Device (Gait) walker, front-wheeled  -     Distance in Feet (Gait) 10  +25  -     Deviations/Abnormal Patterns (Gait) bilateral deviations;tracy decreased;gait speed decreased;stride length decreased  -     Bilateral Gait Deviations forward flexed posture;heel strike decreased  -     Comment, (Gait/Stairs) Pt demo step through gait pattern w/ decreased step length and moderate instability. VCs for upright posture, forward gaze, AD management, and safety awareness. Pt required cues to keep all 4 points of walker on the ground. 4 instances of LOB noted, especially when pt was taking steps backward, that required CGA to Felipe to correct. Distance limited by unsteadiness.  -               User Key  (r) = Recorded By, (t) = Taken By, (c) = Cosigned By      Initials Name Provider Type     Darcy Trujillo PT Physical Therapist                   Obj/Interventions       Row Name 10/23/24 0918          Range of Motion Comprehensive    General Range of Motion lower extremity range of motion deficits identified  -       Row Name 10/23/24 0918          Strength Comprehensive (MMT)    General Manual Muscle Testing (MMT) Assessment lower extremity strength deficits identified  -     Comment, General Manual Muscle Testing (MMT) Assessment BLEs grossly 4+/5  -       Row Name 10/23/24 0918          Balance    Balance Assessment sitting static balance;sitting dynamic  balance;standing static balance;standing dynamic balance  -     Static Sitting Balance standby assist  -     Dynamic Sitting Balance standby assist  -     Position, Sitting Balance unsupported;sitting edge of bed  -     Static Standing Balance contact guard;verbal cues  -     Dynamic Standing Balance minimal assist;verbal cues  -     Position/Device Used, Standing Balance supported;walker, front-wheeled  -     Balance Interventions sitting;standing;sit to stand;supported;static;dynamic  -     Comment, Balance Pt moderately unstable when ambulating w/ 4 instances of LOB noted that required CGA to Felipe to correct  -       Row Name 10/23/24 0918          Sensory Assessment (Somatosensory)    Sensory Assessment (Somatosensory) LE sensation intact  -               User Key  (r) = Recorded By, (t) = Taken By, (c) = Cosigned By      Initials Name Provider Type     Darcy Trujillo, PT Physical Therapist                   Goals/Plan       Row Name 10/23/24 0921          Bed Mobility Goal 1 (PT)    Activity/Assistive Device (Bed Mobility Goal 1, PT) bed mobility activities, all  -     [place] Level/Cues Needed (Bed Mobility Goal 1, PT) independent  -     Time Frame (Bed Mobility Goal 1, PT) short term goal (STG);3 days  -       Row Name 10/23/24 0921          Transfer Goal 1 (PT)    Activity/Assistive Device (Transfer Goal 1, PT) sit-to-stand/stand-to-sit;bed-to-chair/chair-to-bed  -     [place] Level/Cues Needed (Transfer Goal 1, PT) modified independence  -     Time Frame (Transfer Goal 1, PT) long term goal (LTG);5 days  -       Row Name 10/23/24 0921          Gait Training Goal 1 (PT)    Activity/Assistive Device (Gait Training Goal 1, PT) gait (walking locomotion);assistive device use  -     [place] Level (Gait Training Goal 1, PT) modified independence  -     Distance (Gait Training Goal 1, PT) 300 ft  -     Time Frame (Gait Training Goal 1, PT) long term goal  (LTG);5 days  -       Row Name 10/23/24 0921          Balance Goal 1 (PT)    Activity/Assistive Device (Balance Goal) standing static balance;standing dynamic balance  -     [place] Level/Cues Needed (Balance Goal 1, PT) modified independence  -     Time Frame (Balance Goal 1, PT) long-term goal (LTG);3-5 days  -       Row Name 10/23/24 0921          Therapy Assessment/Plan (PT)    Planned Therapy Interventions (PT) balance training;bed mobility training;gait training;home exercise program;neuromuscular re-education;patient/family education;postural re-education;ROM (range of motion);strengthening;stretching;transfer training;vestibular therapy  -               User Key  (r) = Recorded By, (t) = Taken By, (c) = Cosigned By      Initials Name Provider Type     Darcy Trujillo, PT Physical Therapist                   Clinical Impression       Row Name 10/23/24 0919          Pain    Pretreatment Pain Rating 0/10 - no pain  -     Posttreatment Pain Rating 0/10 - no pain  -       Row Name 10/23/24 0919          Plan of Care Review    Plan of Care Reviewed With patient  -     Outcome Evaluation PT eval completed. Pt presents below baseline function d/t dizziness, gait instability, and decreased activity tolerance. Performed [place] Hallpike to the R and mild upbeating torsional nystagmus noted w/ mild symptoms reported by pt. Epley for the R side performed for treatment of BPPV. Pt reported diplopia w/ cervical extension and head turned to the L during Epley. Pt then ambulated 10+25 ft w/ FWW, Felipe x1. She was moderately unstable when ambulating w/ 4 instances of LOB noted that required CGA to Felipe to correct. Pt would benefit from skilled IP PT. Recommend IRF at d/c.  -       Row Name 10/23/24 0919          Therapy Assessment/Plan (PT)    Patient/Family Therapy Goals Statement (PT) to go home  -     Rehab Potential (PT) good  -     Criteria for Skilled Interventions Met (PT) yes;meets  criteria;skilled treatment is necessary  -     Therapy Frequency (PT) daily  -     Predicted Duration of Therapy Intervention (PT) 5 days  -       Row Name 10/23/24 0919          Vital Signs    Pre Systolic BP Rehab 134  -LH     Pre Treatment Diastolic BP 60  -LH     Pre Patient Position Supine  -       Row Name 10/23/24 0919          Positioning and Restraints    Pre-Treatment Position in bed  -     Post Treatment Position chair  -     In Chair notified nsg;sitting;reclined;call light within reach;encouraged to call for assist;exit alarm on;waffle cushion;legs elevated  -               User Key  (r) = Recorded By, (t) = Taken By, (c) = Cosigned By      Initials Name Provider Type     Darcy Trujillo PT Physical Therapist                   Outcome Measures       Row Name 10/23/24 0922 10/23/24 0711       How much help from another person do you currently need...    Turning from your back to your side while in flat bed without using bedrails? 3  - 4  -CG    Moving from lying on back to sitting on the side of a flat bed without bedrails? 3  - 3  -CG    Moving to and from a bed to a chair (including a wheelchair)? 3  - 3  -CG    Standing up from a chair using your arms (e.g., wheelchair, bedside chair)? 3  - 3  -CG    Climbing 3-5 steps with a railing? 2  - 3  -CG    To walk in hospital room? 3  - 3  -CG    AM-PAC 6 Clicks Score (PT) 17  - 19  -CG    Highest Level of Mobility Goal 5 --> Static standing  - 6 --> Walk 10 steps or more  -      Row Name 10/23/24 0922          Functional Assessment    Outcome Measure Options AM-PAC 6 Clicks Basic Mobility (PT)  -               User Key  (r) = Recorded By, (t) = Taken By, (c) = Cosigned By      Initials Name Provider Type    Manuelito Clarke RN Registered Nurse     Darcy Trujillo PT Physical Therapist                                 Physical Therapy Education       Title: PT OT SLP Therapies (In Progress)       Topic: Physical  Therapy (In Progress)       Point: Mobility training (Done)       Learning Progress Summary            Patient Acceptance, E, VU,DU,NR by  at 10/23/2024 0922    Comment: educated pt on BPPV, anjali hallpike, and epley manuevers. educated on gait training w/ FWW                      Point: Home exercise program (Not Started)       Learner Progress:  Not documented in this visit.              Point: Body mechanics (Done)       Learning Progress Summary            Patient Acceptance, E, VU,DU,NR by  at 10/23/2024 0922    Comment: educated pt on BPPV, anjali hallpike, and epley manuevers. educated on gait training w/ FWW                      Point: Precautions (Done)       Learning Progress Summary            Patient Acceptance, E, VU,DU,NR by  at 10/23/2024 0922    Comment: educated pt on BPPV, anjali hallpike, and epley manuevers. educated on gait training w/ FWW                                      User Key       Initials Effective Dates Name Provider Type Discipline     09/21/23 -  Darcy Trujillo, PT Physical Therapist PT                  PT Recommendation and Plan  Planned Therapy Interventions (PT): balance training, bed mobility training, gait training, home exercise program, neuromuscular re-education, patient/family education, postural re-education, ROM (range of motion), strengthening, stretching, transfer training, vestibular therapy  Outcome Evaluation: PT eval completed. Pt presents below baseline function d/t dizziness, gait instability, and decreased activity tolerance. Performed [place] Hallpike to the R and mild upbeating torsional nystagmus noted w/ mild symptoms reported by pt. Epley for the R side performed for treatment of BPPV. Pt reported diplopia w/ cervical extension and head turned to the L during Epley. Pt then ambulated 10+25 ft w/ FWW, Felipe x1. She was moderately unstable when ambulating w/ 4 instances of LOB noted that required CGA to Felipe to correct. Pt would benefit from skilled IP PT.  Recommend IRF at d/c.     Time Calculation:   PT Evaluation Complexity  History, PT Evaluation Complexity: 1-2 personal factors and/or comorbidities  Examination of Body Systems (PT Eval Complexity): total of 4 or more elements  Clinical Presentation (PT Evaluation Complexity): evolving  Clinical Decision Making (PT Evaluation Complexity): moderate complexity  Overall Complexity (PT Evaluation Complexity): moderate complexity     PT Charges       Row Name 10/23/24 0923             Time Calculation    Start Time 0825  -      PT Received On 10/23/24  -      PT Goal Re-Cert Due Date 11/02/24  -         Untimed Charges    PT Eval/Re-eval Minutes 35  -LH      PT [place] Repositioning 15  -LH         Total Minutes    Untimed Charges Total Minutes 50  -LH       Total Minutes 50  -LH                User Key  (r) = Recorded By, (t) = Taken By, (c) = Cosigned By      Initials Name Provider Type     Darcy Trujillo, PT Physical Therapist                  Therapy Charges for Today       Code Description Service Date Service Provider Modifiers Qty    97852161997 HC PT EVAL MOD COMPLEXITY 3 10/23/2024 Darcy Trujillo, PT GP 1    35540442625 HC PT CANALITH REPOSITIONING PER DAY 10/23/2024 Darcy Trujillo, PT GP 1            PT G-Codes  Outcome Measure Options: AM-PAC 6 Clicks Basic Mobility (PT)  AM-PAC 6 Clicks Score (PT): 17  AM-PAC 6 Clicks Score (OT): 15  PT Discharge Summary  Anticipated Discharge Disposition (PT): inpatient rehabilitation facility    Darcy Trujillo PT  10/23/2024

## 2024-10-24 PROCEDURE — 97535 SELF CARE MNGMENT TRAINING: CPT

## 2024-10-24 PROCEDURE — 97530 THERAPEUTIC ACTIVITIES: CPT

## 2024-10-24 PROCEDURE — 99232 SBSQ HOSP IP/OBS MODERATE 35: CPT | Performed by: STUDENT IN AN ORGANIZED HEALTH CARE EDUCATION/TRAINING PROGRAM

## 2024-10-24 PROCEDURE — 25010000002 HEPARIN (PORCINE) PER 1000 UNITS

## 2024-10-24 RX ORDER — ACETAMINOPHEN 325 MG/1
650 TABLET ORAL EVERY 6 HOURS PRN
Status: DISCONTINUED | OUTPATIENT
Start: 2024-10-24 | End: 2024-10-25 | Stop reason: HOSPADM

## 2024-10-24 RX ADMIN — Medication 10 ML: at 08:10

## 2024-10-24 RX ADMIN — HEPARIN SODIUM 5000 UNITS: 5000 INJECTION INTRAVENOUS; SUBCUTANEOUS at 21:25

## 2024-10-24 RX ADMIN — CETIRIZINE HYDROCHLORIDE 10 MG: 10 TABLET, FILM COATED ORAL at 21:24

## 2024-10-24 RX ADMIN — HEPARIN SODIUM 5000 UNITS: 5000 INJECTION INTRAVENOUS; SUBCUTANEOUS at 08:10

## 2024-10-24 RX ADMIN — MECLIZINE HYDROCHLORIDE 12.5 MG: 25 TABLET ORAL at 09:58

## 2024-10-24 RX ADMIN — MECLIZINE HYDROCHLORIDE 12.5 MG: 25 TABLET ORAL at 21:24

## 2024-10-24 RX ADMIN — ACETAMINOPHEN 650 MG: 325 TABLET ORAL at 22:51

## 2024-10-24 NOTE — THERAPY TREATMENT NOTE
"Patient Name: Kathleen Goetz  : 1950    MRN: [id number]                              Today's Date: 10/24/2024       Admit Date: 10/21/2024    Visit Dx:     ICD-10-CM ICD-9-CM   1. Vertigo  R42 780.4   2. Nausea and vomiting, unspecified vomiting type  R11.2 787.01     Patient Active Problem List   Diagnosis    Arthritis    Thumb pain    Annual physical exam    Family history of breast cancer    Family history of cancer    Vertigo    Elevated glucose    Nausea & vomiting     Past Medical History:   Diagnosis Date    Anemia     as a teenager    Anemia     Annual physical exam 2017    Arthritis 2015    Somewhat mitigated by wax baths, Aleve, braces    Cataract  or earlier    developing slowly    Colon polyp ?    Removed during colonoscopy    Foot fracture, left     Foot fracture, left     Heart murmur early     Hasn't caused any problems    Low back pain Early     No longer a problem    Pain of right thumb     Pneumonia Early     \"walking\" pneumonia     Past Surgical History:   Procedure Laterality Date    COLONOSCOPY      Every 5-6 years since age 41.  Last time ?    COSMETIC SURGERY      Eyelid surgery to mitigate vision impediment    EYE SURGERY  early     Removal of metal fragment      General Information       Row Name 10/24/24 1223          OT Time and Intention    Document Type therapy note (daily note)  -JR     Mode of Treatment occupational therapy  -JR       Row Name 10/24/24 1223          General Information    Patient Profile Reviewed yes  -JR     Existing Precautions/Restrictions fall  vertigo (significant dizziness with position changes)  -JR     Barriers to Rehab medically complex  -JR       Row Name 10/24/24 1223          Cognition    Orientation Status (Cognition) oriented x 3  -JR       Row Name 10/24/24 1223          Safety Issues/Impairments Affecting Functional Mobility    Safety Issues Affecting Function (Mobility) awareness of need for " "assistance;insight into deficits/self-awareness;problem-solving;safety precaution awareness;safety precautions follow-through/compliance;sequencing abilities;positioning of assistive device  -     Impairments Affecting Function (Mobility) balance;endurance/activity tolerance;strength  -               User Key  (r) = Recorded By, (t) = Taken By, (c) = Cosigned By      Initials Name Provider Type     Vanessa Blanchard, OT Occupational Therapist                     Mobility/ADL's       Row Name 10/24/24 1224          Bed Mobility    Comment, (Bed Mobility) up in chair upon arrival, just back from [facility].  -       Row Name 10/24/24 1224          Transfers    Transfers sit-stand transfer  -       Row Name 10/24/24 1224          Sit-Stand Transfer    Sit-Stand [place] (Transfers) contact guard;verbal cues  -     Assistive Device (Sit-Stand Transfers) walker, 4-wheeled  -     Comment, (Sit-Stand Transfer) Verabl cues for hand placement with transfers as well as verbal cues for safety due to vertigo with position changes.  -[facility] Name 10/24/24 1224          Functional Mobility    Functional Mobility- Ind. Level contact guard assist;verbal cues required  -     Functional Mobility- Device walker, 4-wheeled  -     Functional Mobility-Distance (Feet) --  in hallway  -     Functional Mobility- Comment No LOB noted with mobility with 4 wheel walker this date. Pt reported feeling \"woozy\" during mobility and pt took seated rest period.  -       Row Name 10/24/24 1224          Activities of Daily Living    BADL Assessment/Intervention grooming  -       Row Name 10/24/24 1224          Grooming Assessment/Training    [place] Level (Grooming) oral care regimen  -     Position (Grooming) supported sitting  -     Comment, (Grooming) pt also used wipes and applied deoderant this date.  -               User Key  (r) = Recorded By, (t) = Taken By, (c) = Cosigned By      Initials Name Provider Type "    JR Vanessa Blanchard OT Occupational Therapist                   Obj/Interventions       Row Name 10/24/24 1227          Balance    Dynamic Standing Balance contact guard  -     Position/Device Used, Standing Balance supported;walker, 4-wheeled  -JR               User Key  (r) = Recorded By, (t) = Taken By, (c) = Cosigned By      Initials Name Provider Type    JR Vanessa Blanchard, OT Occupational Therapist                   Goals/Plan    No documentation.                  Clinical Impression       Row Name 10/24/24 1227          Pain Assessment    Pretreatment Pain Rating 0/10 - no pain  -JR     Posttreatment Pain Rating 0/10 - no pain  -JR       Row Name 10/24/24 1227          Plan of Care Review    Plan of Care Reviewed With patient  -     Progress improving  -     Outcome Evaluation Pt improving with balance and mobility as well as ADL's this date. Pt remains below baseline with ADL's and mobility due to decreased strength, balance and activity tolerance. Recommend continued skilled OT services and transfer to IRF at d/c for best functional outcomes.  -       Row Name 10/24/24 1227          Therapy Plan Review/Discharge Plan (OT)    Anticipated Discharge Disposition (OT) inpatient rehabilitation facility  -       Row Name 10/24/24 1227          Vital Signs    Pre Systolic BP Rehab 171  -JR     Pre Treatment Diastolic BP 84  -JR     Post Systolic BP Rehab 164  -JR     Post Treatment Diastolic BP 73  -JR     Pretreatment Heart Rate (beats/min) 63  -JR     Posttreatment Heart Rate (beats/min) 61  -JR     Post SpO2 (%) 96  -JR     O2 Delivery Post Treatment room air  -JR     Pre Patient Position Sitting  -JR     Intra Patient Position Standing  -JR     Post Patient Position Sitting  -JR       Row Name 10/24/24 1227          Positioning and Restraints    Pre-Treatment Position sitting in chair/recliner  -JR     Post Treatment Position chair  -JR     In Chair notified nsg;reclined;call light within  reach;encouraged to call for assist;exit alarm on  -               User Key  (r) = Recorded By, (t) = Taken By, (c) = Cosigned By      Initials Name Provider Type    Vanessa Quinonez, OT Occupational Therapist                   Outcome Measures       Row Name 10/24/24 1229          How much help from another is currently needed...    Putting on and taking off regular lower body clothing? 2  -JR     Bathing (including washing, rinsing, and drying) 2  -JR     Toileting (which includes using toilet bed pan or urinal) 2  -JR     Putting on and taking off regular upper body clothing 3  -JR     Taking care of personal grooming (such as brushing teeth) 3  -JR     Eating meals 4  -JR     AM-PAC 6 Clicks Score (OT) 16  -JR       Row Name 10/24/24 0730          How much help from another person do you currently need...    Turning from your back to your side while in flat bed without using bedrails? 4  -CG     Moving from lying on back to sitting on the side of a flat bed without bedrails? 4  -CG     Moving to and from a bed to a chair (including a wheelchair)? 3  -CG     Standing up from a chair using your arms (e.g., wheelchair, bedside chair)? 3  -CG     Climbing 3-5 steps with a railing? 2  -CG     To walk in hospital room? 3  -CG     AM-PAC 6 Clicks Score (PT) 19  -CG     Highest Level of Mobility Goal 6 --> Walk 10 steps or more  -CG       Row Name 10/24/24 1229          Functional Assessment    Outcome Measure Options AM-PAC 6 Clicks Daily Activity (OT)  -               User Key  (r) = Recorded By, (t) = Taken By, (c) = Cosigned By      Initials Name Provider Type    Vanessa Quinonez OT Occupational Therapist    CG Manuelito Garber, RN Registered Nurse                    Occupational Therapy Education       Title: PT OT SLP Therapies (In Progress)       Topic: Occupational Therapy (In Progress)       Point: ADL training (Done)       Description:   Instruct learner(s) on proper safety adaptation and  remediation techniques during self care or transfers.   Instruct in proper use of assistive devices.                  Learning Progress Summary            Patient Acceptance, E, VU,NR by  at 10/24/2024 1100    Comment: Educated pt regarding slow position changes as well as pausing with position changes to minimize dizziness    Acceptance, E, VU by  at 10/22/2024 1458                      Point: Home exercise program (Not Started)       Description:   Instruct learner(s) on appropriate technique for monitoring, assisting and/or progressing therapeutic exercises/activities.                  Learner Progress:  Not documented in this visit.              Point: Precautions (Done)       Description:   Instruct learner(s) on prescribed precautions during self-care and functional transfers.                  Learning Progress Summary            Patient Acceptance, E, VU,NR by  at 10/24/2024 1100    Comment: Educated pt regarding slow position changes as well as pausing with position changes to minimize dizziness    Acceptance, E, VU by  at 10/22/2024 1458                      Point: Body mechanics (Done)       Description:   Instruct learner(s) on proper positioning and spine alignment during self-care, functional mobility activities and/or exercises.                  Learning Progress Summary            Patient Acceptance, E, VU by  at 10/22/2024 1458                                      User Key       Initials Effective Dates Name Provider Type Discipline     02/03/23 -  Vanessa Blanchard, OT Occupational Therapist OT     10/14/22 -  Alivia Horn OT Occupational Therapist OT                  OT Recommendation and Plan     Plan of Care Review  Plan of Care Reviewed With: patient  Progress: improving  Outcome Evaluation: Pt improving with balance and mobility as well as ADL's this date. Pt remains below baseline with ADL's and mobility due to decreased strength, balance and activity tolerance. Recommend  continued skilled OT services and transfer to IRF at d/c for best functional outcomes.     Time Calculation:         Time Calculation- OT       Row Name 10/24/24 1231             Time Calculation- OT    OT Start Time 1100  -JR      OT Received On 10/24/24  -JR         Timed Charges    46311 - OT Therapeutic Activity Minutes 14  -JR      35424 - OT Self Care/Mgmt Minutes 15  -JR         Total Minutes    Timed Charges Total Minutes 29  -JR       Total Minutes 29  -JR                User Key  (r) = Recorded By, (t) = Taken By, (c) = Cosigned By      Initials Name Provider Type     Vanessa Blanchard OT Occupational Therapist                  Therapy Charges for Today       Code Description Service Date Service Provider Modifiers Qty    12840048188 HC OT THERAPEUTIC ACT EA 15 MIN 10/24/2024 Vanessa Blanchard OT GO 1    79437301660 HC OT SELF CARE/MGMT/TRAIN EA 15 MIN 10/24/2024 Vanessa Blanchard OT GO 1                 Vanessa Blanchard OT  10/24/2024

## 2024-10-24 NOTE — PROGRESS NOTES
[facility] Medicine Services  PROGRESS NOTE    Patient Name: Kathleen Goetz  : 1950  MRN: [id number]    Date of Admission: 10/21/2024  Primary Care Physician: Rachele Paulino MD    Subjective   Subjective     CC:  Dizziness       HPI:  Patient reports improvement in vertigo but still present in a mild manner.  She is states that it did improve after the Epley maneuver yesterday.  She is reluctant to proceed with SNF upon discharge does admit that she has support at home that can look after her.      Objective   Objective     Vital Signs:   Temp:  [97.9 °F (36.6 °C)-98.6 °F (37 °C)] 98 °F (36.7 °C)  Heart Rate:  [48-74] 54  Resp:  [18] 18  BP: (116-156)/(64-70) 153/70     Physical Exam:  General appearance: alert, awake, oriented, no acute distress   Cardiovascular: RRR  Respiratory: Oxygenating well on room air, no respiratory distress  Neuro/CNS: alert and oriented x3, normal speech       Results Reviewed:  LAB RESULTS:      Lab 10/22/24  0410 10/21/24  1914 10/21/24  1602   WBC 10.25  --  8.45   HEMOGLOBIN 12.7  --  14.7   HEMATOCRIT 37.8  --  44.3   PLATELETS 174  --  225   NEUTROS ABS 8.74*  --  4.89   IMMATURE GRANS (ABS) 0.02  --  0.03   LYMPHS ABS 0.92  --  2.48   MONOS ABS 0.54  --  0.75   EOS ABS 0.01  --  0.24   MCV 90.2  --  90.2   LACTATE  --  1.7 2.6*         Lab 10/22/24  0410 10/21/24  1602   SODIUM 138 142   POTASSIUM 4.8 3.9   CHLORIDE 103 103   CO2 26.0 27.0   ANION GAP 9.0 12.0   BUN 13 14   CREATININE 0.86 0.88   EGFR 71.4 69.5   GLUCOSE 132* 144*   CALCIUM 9.0 10.1   MAGNESIUM  --  2.2   HEMOGLOBIN A1C  --  5.20   TSH 1.230  --          Lab 10/22/24  0410 10/21/24  1602   TOTAL PROTEIN 5.8* 7.1   ALBUMIN 3.8 4.4   GLOBULIN 2.0 2.7   ALT (SGPT) 13 18   AST (SGOT) 18 21   BILIRUBIN 0.2 0.2   ALK PHOS 57 68   LIPASE  --  35                     Brief Urine Lab Results  (Last result in the past 365 days)        Color   Clarity   Blood   Leuk Est   Nitrite    Protein   CREAT   Urine HCG        10/21/24 5281 Yellow   Clear   Negative   Trace   Negative   Negative                   Microbiology Results Abnormal       None            Adult Transthoracic Echo Complete W/ Cont if Necessary Per Protocol    Result Date: 10/22/2024    Left ventricular systolic function is normal. Calculated left ventricular EF = 63.9% Left ventricular ejection fraction appears to be 61 - 65%.   Left ventricular diastolic function was normal.   The right atrial cavity is dilated.   RV size and function preserved.   Estimated right ventricular systolic pressure from tricuspid regurgitation is normal (<35 mmHg). Calculated right ventricular systolic pressure from tricuspid regurgitation is 22 mmHg.      Results for orders placed during the hospital encounter of 10/21/24    Adult Transthoracic Echo Complete W/ Cont if Necessary Per Protocol    Interpretation Summary    Left ventricular systolic function is normal. Calculated left ventricular EF = 63.9% Left ventricular ejection fraction appears to be 61 - 65%.    Left ventricular diastolic function was normal.    The right atrial cavity is dilated.    RV size and function preserved.    Estimated right ventricular systolic pressure from tricuspid regurgitation is normal (<35 mmHg). Calculated right ventricular systolic pressure from tricuspid regurgitation is 22 mmHg.      Current medications:  Scheduled Meds:cetirizine, 10 mg, Oral, Nightly  heparin (porcine), 5,000 Units, Subcutaneous, Q12H  Scopolamine, 1 patch, Transdermal, Once  sodium chloride, 10 mL, Intravenous, Q12H      Continuous Infusions:   PRN Meds:.  senna-docusate sodium **AND** polyethylene glycol **AND** bisacodyl **AND** bisacodyl    Calcium Replacement - Follow Nurse / BPA Driven Protocol    Magnesium Standard Dose Replacement - Follow Nurse / BPA Driven Protocol    meclizine    melatonin    ondansetron ODT **OR** ondansetron    Phosphorus Replacement - Follow Nurse / BPA Driven  Protocol    Potassium Replacement - Follow Nurse / BPA Driven Protocol    Sodium Chloride (PF)    sodium chloride    Assessment & Plan   Assessment & Plan     Active Hospital Problems    Diagnosis  POA    **Vertigo [R42]  Yes    Elevated glucose [R73.09]  Yes    Nausea & vomiting [R11.2]  Yes      Resolved Hospital Problems   No resolved problems to display.        Brief Hospital Course to date:  Kathleen Goetz is a 74 y.o. female who was admitted for evaluation of vertigo symptoms.     BPPV  -MRI brain negative for acute stroke  -Dahlgren-Hallpike positive  -Continue vestibular rehab with Epley maneuver and meclizine as needed; continue on discharge  -Consider pulsed a steroid tomorrow    Expected Discharge Location and Transportation: Home w    Expected Discharge   Expected Discharge Date: 10/25/2024; Expected Discharge Time:      VTE Prophylaxis:  Pharmacologic VTE prophylaxis orders are present.         AM-PAC 6 Clicks Score (PT): 19 (10/24/24 5830)    CODE STATUS:   Code Status and Medical Interventions: CPR (Attempt to Resuscitate); Full Support   Ordered at: 10/21/24 4539     Level Of Support Discussed With:    Patient     Code Status (Patient has no pulse and is not breathing):    CPR (Attempt to Resuscitate)     Medical Interventions (Patient has pulse or is breathing):    Full Support       Kel Max, DO  10/24/24

## 2024-10-24 NOTE — CASE MANAGEMENT/SOCIAL WORK
Continued Stay Note  [facility]     Patient Name: Kathleen Goetz  MRN: [id number]  Today's Date: 10/24/2024    Admit Date: 10/21/2024    Plan: discharge plan   Discharge Plan       Row Name 10/24/24 1619       Plan    Plan discharge plan    Plan Comments I met with pt in room regarding discharge plan. Pt reports she wants to go home and not interested in inpatient rehab or HH. Pt reports someone will take her home. CM will take her home    Final Discharge Disposition Code 01 - home or self-care                   Discharge Codes    No documentation.                 Expected Discharge Date and Time       Expected Discharge Date Expected Discharge Time    Oct 25, 2024               Polina Simon RN

## 2024-10-24 NOTE — PLAN OF CARE
Goal Outcome Evaluation:  Plan of Care Reviewed With: patient        Progress: improving  Outcome Evaluation: Pt improving with balance and mobility as well as ADL's this date. Pt remains below baseline with ADL's and mobility due to decreased strength, balance and activity tolerance. Recommend continued skilled OT services and transfer to IRF at d/c for best functional outcomes.    Anticipated Discharge Disposition (OT): inpatient rehabilitation facility

## 2024-10-24 NOTE — PLAN OF CARE
Goal Outcome Evaluation:  Plan of Care Reviewed With: patient        Progress: improving  Outcome Evaluation: Patient A+Ox4, denies pain or SOA on room air.  VSS, SB via the monitor.  Patients gait has improved when compared to 10-23-24.  C/o vertigo when standing, PRN Meclizine given.  Pt reports vertigo has improved with Meclizine.  Currently up into bedside chair with alarms activated for patient safety.  Possible discharge 10-25-24.  Will continue with current POC.

## 2024-10-25 ENCOUNTER — TELEPHONE (OUTPATIENT)
Dept: INTERNAL MEDICINE | Facility: CLINIC | Age: 74
End: 2024-10-25

## 2024-10-25 ENCOUNTER — READMISSION MANAGEMENT (OUTPATIENT)
Dept: CALL CENTER | Facility: HOSPITAL | Age: 74
End: 2024-10-25
Payer: MEDICARE

## 2024-10-25 VITALS
RESPIRATION RATE: 18 BRPM | WEIGHT: 160.05 LBS | HEIGHT: 61 IN | SYSTOLIC BLOOD PRESSURE: 137 MMHG | DIASTOLIC BLOOD PRESSURE: 76 MMHG | TEMPERATURE: 98.5 F | OXYGEN SATURATION: 91 % | BODY MASS INDEX: 30.22 KG/M2 | HEART RATE: 74 BPM

## 2024-10-25 PROBLEM — H81.10 BPPV (BENIGN PAROXYSMAL POSITIONAL VERTIGO), UNSPECIFIED LATERALITY: Status: ACTIVE | Noted: 2024-10-21

## 2024-10-25 PROBLEM — R11.2 NAUSEA & VOMITING: Status: RESOLVED | Noted: 2024-10-21 | Resolved: 2024-10-25

## 2024-10-25 PROCEDURE — 99239 HOSP IP/OBS DSCHRG MGMT >30: CPT | Performed by: STUDENT IN AN ORGANIZED HEALTH CARE EDUCATION/TRAINING PROGRAM

## 2024-10-25 PROCEDURE — 25010000002 HEPARIN (PORCINE) PER 1000 UNITS

## 2024-10-25 PROCEDURE — 63710000001 PREDNISONE PER 1 MG: Performed by: STUDENT IN AN ORGANIZED HEALTH CARE EDUCATION/TRAINING PROGRAM

## 2024-10-25 RX ORDER — MECLIZINE HCL 12.5 MG 12.5 MG/1
12.5 TABLET ORAL 3 TIMES DAILY PRN
Qty: 30 TABLET | Refills: 0 | Status: SHIPPED | OUTPATIENT
Start: 2024-10-25

## 2024-10-25 RX ORDER — PREDNISONE 20 MG/1
20 TABLET ORAL ONCE
Status: COMPLETED | OUTPATIENT
Start: 2024-10-25 | End: 2024-10-25

## 2024-10-25 RX ORDER — PREDNISONE 20 MG/1
20 TABLET ORAL DAILY
Qty: 3 TABLET | Refills: 0 | Status: SHIPPED | OUTPATIENT
Start: 2024-10-25 | End: 2024-10-28

## 2024-10-25 RX ADMIN — HEPARIN SODIUM 5000 UNITS: 5000 INJECTION INTRAVENOUS; SUBCUTANEOUS at 08:51

## 2024-10-25 RX ADMIN — PREDNISONE 20 MG: 20 TABLET ORAL at 11:27

## 2024-10-25 RX ADMIN — Medication 10 ML: at 08:51

## 2024-10-25 NOTE — OUTREACH NOTE
Prep Survey      Flowsheet Row Responses   [place] facility patient discharged from? [facility]   Is LACE score < 7 ? No   Eligibility [facility]   Date of Admission 10/21/24   Date of Discharge 10/25/24   Discharge Disposition Home or Self Care   Discharge diagnosis Benign paroxysmal positional vertigo   Does the patient have one of the following disease processes/diagnoses(primary or secondary)? Other   Does the patient have Home health ordered? No   Is there a DME ordered? No   Comments regarding appointments ambulatory PT   Prep survey completed? Yes            JUANITO GARCIA - Registered Nurse

## 2024-10-25 NOTE — DISCHARGE SUMMARY
[facility] Medicine Services  DISCHARGE SUMMARY    Patient Name: Kathleen Goetz  : 1950  MRN: [id number]    Date of Admission: 10/21/2024  3:53 PM  Date of Discharge:  10/25/2024  Primary Care Physician: Rachele Paulino MD    Consults       No orders found from 2024 to 10/22/2024.            Hospital Course     Presenting Problem: BPPV    Active Hospital Problems    Diagnosis  POA    **BPPV (benign paroxysmal positional vertigo), unspecified laterality [H81.10]  Yes      Resolved Hospital Problems    Diagnosis Date Resolved POA    Nausea & vomiting [R11.2] 10/25/2024 Yes          Hospital Course:  Kathleen Goetz is a 74 y.o. female who was admitted for BPPV after presenting with complaints of dizziness.  She underwent an MRI brain with no acute finding suggestive of an acute ischemic stroke contributing to her symptoms.  She had a positive Sharon-Hallpike and improvement after Epley maneuver but not full resolution.  Patient worked with physical therapy and Occupational Therapy indicated she would benefit from rehab; patient elected home with vestibular rehab.  She is determined to be stable for discharge.    BPPV  -MRI brain negative for acute stroke  -Sharon-Hallpike positive  -Continue vestibular rehab with Epley maneuver and meclizine as needed; Pulse dose steroid       Discharge Follow Up Recommendations for outpatient labs/diagnostics:  Follow-up with PCP in 1 week    Day of Discharge     HPI:   Patient reports feeling better today, was able to ambulate to the restroom without using her walker.  She states she still has some mild dizziness when moving around but has improved since admission.  She feels ready for discharge today.      Vital Signs:   Temp:  [97.8 °F (36.6 °C)-98.5 °F (36.9 °C)] 98.5 °F (36.9 °C)  Heart Rate:  [53-78] 74  Resp:  [18] 18  BP: (129-147)/(60-76) 137/76      Physical Exam:  General appearance: alert, awake, oriented, no acute distress    EENT: Right maxillary/mastoid sinus tenderness to palpation  Cardiovascular: RRR, no murmurs or rubs, radial pulse full 2/4 BL   Respiratory: CTAB, no crackles or wheezes   Neuro/CNS: alert and oriented x3, normal speech, no focal motor or sensory deficits    Pertinent  and/or Most Recent Results     LAB RESULTS:      Lab 10/22/24  0410 10/21/24  1914 10/21/24  1602   WBC 10.25  --  8.45   HEMOGLOBIN 12.7  --  14.7   HEMATOCRIT 37.8  --  44.3   PLATELETS 174  --  225   NEUTROS ABS 8.74*  --  4.89   IMMATURE GRANS (ABS) 0.02  --  0.03   LYMPHS ABS 0.92  --  2.48   MONOS ABS 0.54  --  0.75   EOS ABS 0.01  --  0.24   MCV 90.2  --  90.2   LACTATE  --  1.7 2.6*         Lab 10/22/24  0410 10/21/24  1602   SODIUM 138 142   POTASSIUM 4.8 3.9   CHLORIDE 103 103   CO2 26.0 27.0   ANION GAP 9.0 12.0   BUN 13 14   CREATININE 0.86 0.88   EGFR 71.4 69.5   GLUCOSE 132* 144*   CALCIUM 9.0 10.1   MAGNESIUM  --  2.2   HEMOGLOBIN A1C  --  5.20   TSH 1.230  --          Lab 10/22/24  0410 10/21/24  1602   TOTAL PROTEIN 5.8* 7.1   ALBUMIN 3.8 4.4   GLOBULIN 2.0 2.7   ALT (SGPT) 13 18   AST (SGOT) 18 21   BILIRUBIN 0.2 0.2   ALK PHOS 57 68   LIPASE  --  35                     Brief Urine Lab Results  (Last result in the past 365 days)        Color   Clarity   Blood   Leuk Est   Nitrite   Protein   CREAT   Urine HCG        10/21/24 1857 Yellow   Clear   Negative   Trace   Negative   Negative                 Microbiology Results (last 10 days)       ** No results found for the last 240 hours. **            Adult Transthoracic Echo Complete W/ Cont if Necessary Per Protocol    Result Date: 10/22/2024    Left ventricular systolic function is normal. Calculated left ventricular EF = 63.9% Left ventricular ejection fraction appears to be 61 - 65%.   Left ventricular diastolic function was normal.   The right atrial cavity is dilated.   RV size and function preserved.   Estimated right ventricular systolic pressure from tricuspid regurgitation  is normal (<35 mmHg). Calculated right ventricular systolic pressure from tricuspid regurgitation is 22 mmHg.     MRI Brain Without Contrast    Result Date: 10/21/2024  MRI BRAIN WO CONTRAST Date of Exam: 10/21/2024 9:06 PM EDT Indication: acute vertigo weakness, ct [id number].  Comparison: None available. Technique:  Routine multiplanar/multisequence sequence images of the brain were obtained without contrast administration. Findings: No acute infarct or abnormal restricted diffusion. There is no evidence of hemorrhage. No mass effect, edema or midline shift Few scattered periventricular and subcortical T2/FLAIR hyperintensities are nonspecific. No extra-axial fluid collection. Prominent ventricular system secondary to chronic parenchymal volume loss. The visualized flow voids are unremarkable. Partially empty sella. Otherwise the midline structures are unremarkable. Status post bilateral lens extraction. Otherwise the orbits unremarkable. The visualized paranasal sinuses and mastoid air cells are clear. The visualized soft tissues are unremarkable. No acute osseous abnormality.     Impression: No acute intracranial abnormality. A few scattered periventricular and subcortical T2/FLAIR hyperintensities are nonspecific but most likely represent chronic small vessel ischemic changes. Electronically Signed: Jose Cueva DO  10/21/2024 10:01 PM EDT  Workstation ID: [id number]    XR Orbits 4+ View    Result Date: 10/21/2024  XR ORBITS 4+ VW Date of Exam: 10/21/2024 5:37 PM EDT Indication: MRI clearance Comparison: None available. Findings: No metallic foreign bodies are seen in the orbits. No acute bony abnormalities.     Impression: No metallic foreign bodies are seen in the orbits. Electronically Signed: Micah Garrett DO  10/21/2024 6:06 PM EDT  Workstation ID: [id number]             Results for orders placed during the hospital encounter of 10/21/24    Adult Transthoracic Echo Complete W/ Cont if Necessary Per  Protocol    Interpretation Summary    Left ventricular systolic function is normal. Calculated left ventricular EF = 63.9% Left ventricular ejection fraction appears to be 61 - 65%.    Left ventricular diastolic function was normal.    The right atrial cavity is dilated.    RV size and function preserved.    Estimated right ventricular systolic pressure from tricuspid regurgitation is normal (<35 mmHg). Calculated right ventricular systolic pressure from tricuspid regurgitation is 22 mmHg.      Plan for Follow-up of Pending Labs/Results:     Discharge Details        Discharge Medications        New Medications        Instructions Start Date   meclizine 12.5 MG tablet  Commonly known as: ANTIVERT   12.5 mg, Oral, 3 Times Daily PRN      predniSONE 20 MG tablet  Commonly known as: DELTASONE   20 mg, Oral, Daily             Continue These Medications        Instructions Start Date   Calcium Carbonate 1500 (600 Ca) MG tablet   1 tablet, 2 Times Daily With Meals      cetirizine 10 MG tablet  Commonly known as: zyrTEC   Take one po daily      Daily Vitamin tablet  Generic drug: multivitamin   1 tablet, Daily      Osteo Bi-Flex Triple Strength tablet   1 tablet, 2 times daily      TURMERIC PO   1 capsule, Daily               No Known Allergies      Discharge Disposition:  Home or Self Care    Diet:  Hospital:  Diet Order   Procedures    Diet: Regular/House; Fluid Consistency: Thin (IDDSI 0)       Diet Instructions       Diet: Regular/House Diet; Thin (IDDSI 0)      Discharge Diet: Regular/House Diet    Fluid Consistency: Thin (IDDSI 0)             Activity:  Activity Instructions       Activity as Tolerated              Restrictions or Other Recommendations:         CODE STATUS:    Code Status and Medical Interventions: CPR (Attempt to Resuscitate); Full Support   Ordered at: 10/21/24 7637     Level Of Support Discussed With:    Patient     Code Status (Patient has no pulse and is not breathing):    CPR (Attempt to  Resuscitate)     Medical Interventions (Patient has pulse or is breathing):    Full Support       Future Appointments   Date Time Provider Department Center   10/29/2024 12:30 PM Rachele Paulino MD [facility]   12/2/2024  8:45 AM Rachele Paulino MD [facility]       Additional Instructions for the Follow-ups that You Need to Schedule       Ambulatory Referral to Physical Therapy   As directed      Specialty needed: Vestibular   Expected therapy start date, if not ASAP: ASAP   Follow-up needed: Yes        Discharge Follow-up with PCP   As directed       Currently Documented PCP:    Rachele Paulino MD    PCP Phone Number:    [phone]     Follow Up Details: 1 week                      Kel Max DO  10/25/24      Time Spent on Discharge:  I spent  40  minutes on this discharge activity which included: face-to-face encounter with the patient, reviewing the data in the system, coordination of the care with the nursing staff as well as consultants, documentation, and entering orders.

## 2024-10-25 NOTE — TELEPHONE ENCOUNTER
SPOKE TO PATIENT AND THEY HAVE TO WORK ON 10/29 AND THE ONLY TIMES SHE COULD MAKE AN APPOINTMENT WOULD BE ON MONDAY PAST 3:30, EARLY ON TUESDAY, OR ANYTIME ON A FRIDAY

## 2024-10-25 NOTE — CASE MANAGEMENT/SOCIAL WORK
Case Management Discharge Note      Final Note: Per medical team, pt is medically ready for discharge today and plan is home. Pt provided with an ambulatory PT order to take with as pt will make own appt. Pt reports she has transportation home and denies further discharge needs.         Selected Continued Care - Admitted Since 10/21/2024       Destination    No services have been selected for the patient.                Durable Medical Equipment    No services have been selected for the patient.                Dialysis/Infusion    No services have been selected for the patient.                Home Medical Care    No services have been selected for the patient.                Therapy    No services have been selected for the patient.                Community Resources    No services have been selected for the patient.                Community & DME    No services have been selected for the patient.                         Final Discharge Disposition Code: 01 - home or self-care

## 2024-10-28 ENCOUNTER — TRANSITIONAL CARE MANAGEMENT TELEPHONE ENCOUNTER (OUTPATIENT)
Dept: CALL CENTER | Facility: HOSPITAL | Age: 74
End: 2024-10-28
Payer: MEDICARE

## 2024-10-28 NOTE — OUTREACH NOTE
Call Center TCM Note      Flowsheet Row Responses   [facility] patient discharged from? [place]   Does the patient have one of the following disease processes/diagnoses(primary or secondary)? Other   TCM attempt successful? Yes   Call start time 1615   Call end time 1621   Discharge diagnosis Benign paroxysmal positional vertigo   Meds reviewed with patient/caregiver? Yes   Is the patient having any side effects they believe may be caused by any medication additions or changes? No   Does the patient have all medications ordered at discharge? Yes   Is the patient taking all medications as directed (includes completed medication regime)? Yes   Comments PCP appt 11/1/24 145 pm. Not listed as hosp dc fu but meets TCM guidelines.   Does the patient have an appointment with their PCP within 7-14 days of discharge? Yes   Has home health visited the patient within 72 hours of discharge? N/A   Psychosocial issues? No   Did the patient receive a copy of their discharge instructions? Yes   Nursing interventions Reviewed instructions with patient   What is the patient's perception of their health status since discharge? Improving   Is the patient/caregiver able to teach back signs and symptoms related to disease process for when to call PCP? Yes   Is the patient/caregiver able to teach back signs and symptoms related to disease process for when to call 911? Yes   Is the patient/caregiver able to teach back the hierarchy of who to call/visit for symptoms/problems? PCP, Specialist, Home health nurse, Urgent Care, ED, 911 Yes   TCM call completed? Yes   Wrap up additional comments Pt reports not alot of improvement yet. Pt has PT orders and will call for appt.   Call end time 1621            Indiana Clemons RN    10/28/2024, 16:22 EDT

## 2024-10-28 NOTE — TELEPHONE ENCOUNTER
Called and spoke to pt. Message from provider given. Pt voiced understanding and appreciation.  Pt can come at 1:45 on Friday   Apt given to Clinical Manager.

## 2024-10-28 NOTE — OUTREACH NOTE
Call Center TCM Note      Flowsheet Row Responses   [facility] patient discharged from? [place]   Does the patient have one of the following disease processes/diagnoses(primary or secondary)? Other   TCM attempt successful? No   Unsuccessful attempts Attempt 1            Indiana Clemons RN    10/28/2024, 11:20 EDT

## 2024-11-01 ENCOUNTER — OFFICE VISIT (OUTPATIENT)
Dept: INTERNAL MEDICINE | Facility: CLINIC | Age: 74
End: 2024-11-01
Payer: MEDICARE

## 2024-11-01 VITALS
HEART RATE: 67 BPM | TEMPERATURE: 97.2 F | BODY MASS INDEX: 29.87 KG/M2 | WEIGHT: 158.2 LBS | HEIGHT: 61 IN | DIASTOLIC BLOOD PRESSURE: 62 MMHG | SYSTOLIC BLOOD PRESSURE: 124 MMHG | OXYGEN SATURATION: 95 %

## 2024-11-01 DIAGNOSIS — H81.10 BENIGN PAROXYSMAL POSITIONAL VERTIGO, UNSPECIFIED LATERALITY: Primary | ICD-10-CM

## 2024-11-01 DIAGNOSIS — N95.2 ATROPHIC VAGINITIS: ICD-10-CM

## 2024-11-01 RX ORDER — AZELASTINE 1 MG/ML
2 SPRAY, METERED NASAL 2 TIMES DAILY
Qty: 30 ML | Refills: 2 | Status: SHIPPED | OUTPATIENT
Start: 2024-11-01

## 2024-11-01 RX ORDER — ESTRADIOL 0.1 MG/G
2 CREAM VAGINAL WEEKLY
Qty: 42 G | Refills: 1 | Status: SHIPPED | OUTPATIENT
Start: 2024-11-01

## 2024-11-01 NOTE — PROGRESS NOTES
Transitional Care Follow Up Visit  Subjective     Kathleen Goetz is a 74 y.o. female who presents for a transitional care management visit.    Within 48 business hours after discharge our office contacted her via telephone to coordinate her care and needs.      I reviewed and discussed the details of that call along with the discharge summary, hospital problems, inpatient lab results, inpatient diagnostic studies, and consultation reports with Kathleen.     Current outpatient and discharge medications have been reconciled for the patient.  Reviewed by: Rachele Paulino MD          10/25/2024     5:43 PM   Date of TCM Phone Call   [facility]   Date of Admission 10/21/2024   Date of Discharge 10/25/2024   Discharge Disposition Home or Self Care     Risk for Readmission (LACE) Score: 7 (10/25/2024  6:00 AM)      History of Present Illness   Finished her steroids, going to PT. Has not taken much guaifenesin.  She is now in a new relationship, unsure If wants to start     Answers submitted by the patient for this visit:  Other (Submitted on 11/1/2024)  Please describe your symptoms.: Severe vertigo and nausea:  hospitalization follow=up  Have you had these symptoms before?: No  How long have you been having these symptoms?: 1-2 weeks  Please list any medications you are currently taking for this condition.: Meclizine 12.5 mg 3x/day, Prednisone 20 mg. 3-day dose completed  Please describe any probable cause for these symptoms. : ???  Came out of nowhere  Primary Reason for Visit (Submitted on 11/1/2024)  What is the primary reason for your visit?: Problem Not Listed      Course During Hospital Stay:    Kathleen was admitted for BPPV after presenting with complaints of dizziness.  She underwent an MRI brain with no acute finding suggestive of an acute ischemic stroke contributing to her symptoms.  She had a positive [place]-Hallpike and improvement after Epley maneuver but not full resolution.  Patient worked  "with physical therapy and Occupational Therapy indicated she would benefit from rehab; patient elected home with vestibular rehab.  She is determined to be stable for discharge.     BPPV  -MRI brain negative for acute stroke  -[place]-Hallpike positive  -Continue vestibular rehab with Epley maneuver and meclizine as needed; Pulse dose steroid         Discharge Follow Up Recommendations for outpatient labs/diagnostics:  Follow-up with PCP in 1 week     The following portions of the patient's history were reviewed and updated as appropriate: allergies, current medications, past family history, past medical history, past social history, past surgical history, and problem list.    Review of Systems   Constitutional: Negative.  Negative for chills and fever.   HENT:  Negative for ear discharge, ear pain, sinus pressure and sore throat.    Respiratory:  Negative for cough, chest tightness and shortness of breath.    Cardiovascular:  Negative for chest pain, palpitations and leg swelling.   Gastrointestinal:  Negative for diarrhea, nausea and vomiting.   Genitourinary:  Positive for vaginal pain.   Musculoskeletal:  Negative for arthralgias, back pain and myalgias.   Neurological:  Positive for dizziness and light-headedness. Negative for syncope and headaches.   Psychiatric/Behavioral:  Negative for confusion and sleep disturbance.        Objective   /62   Pulse 67   Temp 97.2 °F (36.2 °C)   Ht 154.9 cm (60.98\")   Wt 71.8 kg (158 lb 3.2 oz)   SpO2 95% Comment: ra  BMI 29.91 kg/m²   Physical Exam  Vitals and nursing note reviewed.   Constitutional:       Appearance: She is well-developed.   HENT:      Head: Normocephalic and atraumatic.      Right Ear: External ear normal. Tympanic membrane is bulging.      Left Ear: External ear normal. Tympanic membrane is bulging.      Mouth/Throat:      Pharynx: Posterior oropharyngeal erythema present. No oropharyngeal exudate.      Tonsils: No tonsillar abscesses.   Eyes:     "  Conjunctiva/sclera: Conjunctivae normal.      Pupils: Pupils are equal, round, and reactive to light.   Neck:      Thyroid: No thyromegaly.   Cardiovascular:      Rate and Rhythm: Normal rate and regular rhythm.      Pulses: Normal pulses.      Heart sounds: Normal heart sounds. No murmur heard.     No friction rub. No gallop.   Pulmonary:      Effort: Pulmonary effort is normal.      Breath sounds: Normal breath sounds. No stridor.   Abdominal:      General: Bowel sounds are normal. There is no distension.      Palpations: Abdomen is soft.      Tenderness: There is no abdominal tenderness.   Musculoskeletal:      Cervical back: Normal range of motion and neck supple.   Lymphadenopathy:      Cervical: No cervical adenopathy.   Skin:     General: Skin is warm and dry.   Neurological:      Mental Status: She is alert and oriented to person, place, and time.      Cranial Nerves: No cranial nerve deficit.   Psychiatric:         Judgment: Judgment normal.           Current Outpatient Medications:     Calcium Carbonate 1500 (600 Ca) MG tablet, Take 1 tablet by mouth 2 (Two) Times a Day With Meals., Disp: , Rfl:     cetirizine (zyrTEC) 10 MG tablet, Take one po daily, Disp: , Rfl:     meclizine (ANTIVERT) 12.5 MG tablet, Take 1 tablet by mouth 3 (Three) Times a Day As Needed for Dizziness for up to 30 doses., Disp: 30 tablet, Rfl: 0    Misc Natural Products (OSTEO BI-FLEX TRIPLE STRENGTH) tablet, Take 1 tablet by mouth 2 (two) times a day., Disp: , Rfl:     Multiple Vitamin (DAILY VITAMIN) tablet, Take 1 tablet by mouth Daily., Disp: , Rfl:     TURMERIC PO, Take 1 capsule by mouth Daily., Disp: , Rfl:     azelastine (ASTELIN) 0.1 % nasal spray, Administer 2 sprays into the nostril(s) as directed by provider 2 (Two) Times a Day. Use in each nostril as directed, Disp: 30 mL, Rfl: 2    estradiol (ESTRACE VAGINAL) 0.1 MG/GM vaginal cream, Insert 2 g into the vagina 1 (One) Time Per Week. Insert 1 gram intravaginally at HS 1-2  "times a week for atrophy, Disp: 42 g, Rfl: 1      /62   Pulse 67   Temp 97.2 °F (36.2 °C)   Ht 154.9 cm (60.98\")   Wt 71.8 kg (158 lb 3.2 oz)   SpO2 95% Comment: ra  BMI 29.91 kg/m²       Results for orders placed or performed during the hospital encounter of 10/21/24   Comprehensive Metabolic Panel    Collection Time: 10/21/24  4:02 PM    Specimen: Blood   Result Value Ref Range    Glucose 144 (H) 65 - 99 mg/dL    BUN 14 8 - 23 mg/dL    Creatinine 0.88 0.57 - 1.00 mg/dL    Sodium 142 136 - 145 mmol/L    Potassium 3.9 3.5 - 5.2 mmol/L    Chloride 103 98 - 107 mmol/L    CO2 27.0 22.0 - 29.0 mmol/L    Calcium 10.1 8.6 - 10.5 mg/dL    Total Protein 7.1 6.0 - 8.5 g/dL    Albumin 4.4 3.5 - 5.2 g/dL    ALT (SGPT) 18 1 - 33 U/L    AST (SGOT) 21 1 - 32 U/L    Alkaline Phosphatase 68 39 - 117 U/L    Total Bilirubin 0.2 0.0 - 1.2 mg/dL    Globulin 2.7 gm/dL    A/G Ratio 1.6 g/dL    BUN/Creatinine Ratio 15.9 7.0 - 25.0    Anion Gap 12.0 5.0 - 15.0 mmol/L    eGFR 69.5 >60.0 mL/min/1.73   Lipase    Collection Time: 10/21/24  4:02 PM    Specimen: Blood   Result Value Ref Range    Lipase 35 13 - 60 U/L   Lactic Acid, Plasma    Collection Time: 10/21/24  4:02 PM    Specimen: Blood   Result Value Ref Range    Lactate 2.6 (C) 0.5 - 2.0 mmol/L   CBC Auto Differential    Collection Time: 10/21/24  4:02 PM    Specimen: Blood   Result Value Ref Range    WBC 8.45 3.40 - 10.80 10*3/mm3    RBC 4.91 3.77 - 5.28 10*6/mm3    Hemoglobin 14.7 12.0 - 15.9 g/dL    Hematocrit 44.3 34.0 - 46.6 %    MCV 90.2 79.0 - 97.0 fL    MCH 29.9 26.6 - 33.0 pg    MCHC 33.2 31.5 - 35.7 g/dL    RDW 12.8 12.3 - 15.4 %    RDW-SD 42.2 37.0 - 54.0 fl    MPV 11.3 6.0 - 12.0 fL    Platelets 225 140 - 450 10*3/mm3    Neutrophil % 57.9 42.7 - 76.0 %    Lymphocyte % 29.3 19.6 - 45.3 %    Monocyte % 8.9 5.0 - 12.0 %    Eosinophil % 2.8 0.3 - 6.2 %    Basophil % 0.7 0.0 - 1.5 %    Immature Grans % 0.4 0.0 - 0.5 %    Neutrophils, Absolute 4.89 1.70 - 7.00 " 10*3/mm3    Lymphocytes, Absolute 2.48 0.70 - 3.10 10*3/mm3    Monocytes, Absolute 0.75 0.10 - 0.90 10*3/mm3    Eosinophils, Absolute 0.24 0.00 - 0.40 10*3/mm3    Basophils, Absolute 0.06 0.00 - 0.20 10*3/mm3    Immature Grans, Absolute 0.03 0.00 - 0.05 10*3/mm3    nRBC 0.0 0.0 - 0.2 /100 WBC   Magnesium    Collection Time: 10/21/24  4:02 PM    Specimen: Blood   Result Value Ref Range    Magnesium 2.2 1.6 - 2.4 mg/dL   Hemoglobin A1c    Collection Time: 10/21/24  4:02 PM    Specimen: Blood   Result Value Ref Range    Hemoglobin A1C 5.20 4.80 - 5.60 %   Green Top (Gel)    Collection Time: 10/21/24  4:02 PM   Result Value Ref Range    Extra Tube Hold for add-ons.    Lavender Top    Collection Time: 10/21/24  4:02 PM   Result Value Ref Range    Extra Tube hold for add-on    Gold Top - SST    Collection Time: 10/21/24  4:02 PM   Result Value Ref Range    Extra Tube Hold for add-ons.    Gray Top    Collection Time: 10/21/24  4:02 PM   Result Value Ref Range    Extra Tube Hold for add-ons.    Light Blue Top    Collection Time: 10/21/24  4:02 PM   Result Value Ref Range    Extra Tube Hold for add-ons.    Urinalysis With Microscopic If Indicated (No Culture) - Urine, Clean Catch    Collection Time: 10/21/24  6:57 PM    Specimen: Urine, Clean Catch   Result Value Ref Range    Color, UA Yellow Yellow, Straw    Appearance, UA Clear Clear    pH, UA 7.0 5.0 - 8.0    Specific Gravity, UA 1.018 1.001 - 1.030    Glucose, UA Negative Negative    Ketones, UA 15 mg/dL (1+) (A) Negative    Bilirubin, UA Negative Negative    Blood, UA Negative Negative    Protein, UA Negative Negative    Leuk Esterase, UA Trace (A) Negative    Nitrite, UA Negative Negative    Urobilinogen, UA 0.2 E.U./dL 0.2 - 1.0 E.U./dL   Urinalysis, Microscopic Only - Urine, Clean Catch    Collection Time: 10/21/24  6:57 PM    Specimen: Urine, Clean Catch   Result Value Ref Range    RBC, UA 0-2 None Seen, 0-2 /HPF    WBC, UA 0-2 None Seen, 0-2 /HPF    Bacteria, UA  None Seen None Seen, Trace /HPF    Squamous Epithelial Cells, UA 0-2 None Seen, 0-2 /HPF    Hyaline Casts, UA 0-6 0 - 6 /LPF    Methodology Automated Microscopy    STAT Lactic Acid, Reflex    Collection Time: 10/21/24  7:14 PM    Specimen: Blood   Result Value Ref Range    Lactate 1.7 0.5 - 2.0 mmol/L   ECG 12 Lead Other; Dizziness/lightheadedness    Collection Time: 10/22/24 12:56 AM   Result Value Ref Range    QT Interval 434 ms    QTC Interval 429 ms   CBC Auto Differential    Collection Time: 10/22/24  4:10 AM    Specimen: Blood   Result Value Ref Range    WBC 10.25 3.40 - 10.80 10*3/mm3    RBC 4.19 3.77 - 5.28 10*6/mm3    Hemoglobin 12.7 12.0 - 15.9 g/dL    Hematocrit 37.8 34.0 - 46.6 %    MCV 90.2 79.0 - 97.0 fL    MCH 30.3 26.6 - 33.0 pg    MCHC 33.6 31.5 - 35.7 g/dL    RDW 12.8 12.3 - 15.4 %    RDW-SD 42.2 37.0 - 54.0 fl    MPV 11.6 6.0 - 12.0 fL    Platelets 174 140 - 450 10*3/mm3    Neutrophil % 85.2 (H) 42.7 - 76.0 %    Lymphocyte % 9.0 (L) 19.6 - 45.3 %    Monocyte % 5.3 5.0 - 12.0 %    Eosinophil % 0.1 (L) 0.3 - 6.2 %    Basophil % 0.2 0.0 - 1.5 %    Immature Grans % 0.2 0.0 - 0.5 %    Neutrophils, Absolute 8.74 (H) 1.70 - 7.00 10*3/mm3    Lymphocytes, Absolute 0.92 0.70 - 3.10 10*3/mm3    Monocytes, Absolute 0.54 0.10 - 0.90 10*3/mm3    Eosinophils, Absolute 0.01 0.00 - 0.40 10*3/mm3    Basophils, Absolute 0.02 0.00 - 0.20 10*3/mm3    Immature Grans, Absolute 0.02 0.00 - 0.05 10*3/mm3    nRBC 0.0 0.0 - 0.2 /100 WBC   Comprehensive Metabolic Panel    Collection Time: 10/22/24  4:10 AM    Specimen: Blood   Result Value Ref Range    Glucose 132 (H) 65 - 99 mg/dL    BUN 13 8 - 23 mg/dL    Creatinine 0.86 0.57 - 1.00 mg/dL    Sodium 138 136 - 145 mmol/L    Potassium 4.8 3.5 - 5.2 mmol/L    Chloride 103 98 - 107 mmol/L    CO2 26.0 22.0 - 29.0 mmol/L    Calcium 9.0 8.6 - 10.5 mg/dL    Total Protein 5.8 (L) 6.0 - 8.5 g/dL    Albumin 3.8 3.5 - 5.2 g/dL    ALT (SGPT) 13 1 - 33 U/L    AST (SGOT) 18 1 - 32 U/L     Alkaline Phosphatase 57 39 - 117 U/L    Total Bilirubin 0.2 0.0 - 1.2 mg/dL    Globulin 2.0 gm/dL    A/G Ratio 1.9 g/dL    BUN/Creatinine Ratio 15.1 7.0 - 25.0    Anion Gap 9.0 5.0 - 15.0 mmol/L    eGFR 71.4 >60.0 mL/min/1.73   TSH    Collection Time: 10/22/24  4:10 AM    Specimen: Blood   Result Value Ref Range    TSH 1.230 0.270 - 4.200 uIU/mL   Adult Transthoracic Echo Complete W/ Cont if Necessary Per Protocol    Collection Time: 10/22/24  2:52 PM   Result Value Ref Range    EF(MOD-bp) 63.9 %    LVIDd 4.1 cm    LVIDs 2.6 cm    IVSd 0.70 cm    LVPWd 0.70 cm    FS 36.6 %    IVS/LVPW 1.00 cm    ESV(cubed) 17.6 ml    LV Sys Vol (BSA corrected) 15.7 cm2    EDV(cubed) 68.9 ml    LV [place] Vol (BSA corrected) 44.3 cm2    LV mass(C)d 81.7 grams    LVOT area 3.1 cm2    LVOT diam 2.00 cm    EDV(MOD-sp2) 76.3 ml    EDV(MOD-sp4) 76.1 ml    ESV(MOD-sp2) 28.7 ml    ESV(MOD-sp4) 27.0 ml    SV(MOD-sp2) 47.6 ml    SV(MOD-sp4) 49.1 ml    SVi(MOD-SP2) 27.7 ml/m2    SVi(MOD-SP4) 28.6 ml/m2    SVi (LVOT) 60.5 ml/m2    EF(MOD-sp2) 62.4 %    EF(MOD-sp4) 64.5 %    MV E max chilango 97.5 cm/sec    MV A max chilango 51.4 cm/sec    MV dec time 0.20 sec    MV E/A 1.90     LA ESV Index (BP) 26.9 ml/m2    Med Peak E' [place] 9.6 cm/sec    Lat Peak E' [place] 13.5 cm/sec    TR max chilango 236.3 cm/sec    Avg E/e' ratio 8.44     SV(LVOT) 104.0 ml    RV Base 3.8 cm    RV Mid 2.6 cm    RV Length 6.7 cm    TAPSE (>1.6) 2.9 cm    RV S' 12.0 cm/sec    LA dimension (2D)  3.3 cm    LV V1 max 139.0 cm/sec    LV V1 max PG 7.7 mmHg    LV V1 mean PG 4.0 mmHg    LV V1 VTI 33.1 cm    Ao pk chilango 171.0 cm/sec    Ao max PG 11.7 mmHg    Ao mean PG 5.0 mmHg    Ao V2 VTI 37.0 cm    ROHAN(I,D) 2.8 cm2    MV max PG 3.7 mmHg    MV mean PG 1.00 mmHg    MV V2 VTI 32.5 cm    MV P1/2t 88.7 msec    MVA(P1/2t) 2.48 cm2    MVA(VTI) 3.2 cm2    MV dec slope 322.0 cm/sec2    TR max PG 28.9 mmHg    PA acc time 0.10 sec    Ao root diam 3.1 cm    Ascending aorta 3.0 cm    RVSP(TR) 22 mmHg    RAP systole  3 mmHg             Assessment & Plan   Diagnoses and all orders for this visit:    Benign paroxysmal positional vertigo, unspecified laterality  -     azelastine (ASTELIN) 0.1 % nasal spray; Administer 2 sprays into the nostril(s) as directed by provider 2 (Two) Times a Day. Use in each nostril as directed    Atrophic vaginitis  -     estradiol (ESTRACE VAGINAL) 0.1 MG/GM vaginal cream; Insert 2 g into the vagina 1 (One) Time Per Week. Insert 1 gram intravaginally at HS 1-2 times a week for atrophy      [facility], will call in steroids.      Current outpatient and discharge medications have been reconciled for the patient.  Reviewed by: Rachele Paulino MD      Return for Next scheduled follow up.    Electronically signed by:    Rachele Paulino MD

## 2024-11-05 ENCOUNTER — READMISSION MANAGEMENT (OUTPATIENT)
Dept: CALL CENTER | Facility: HOSPITAL | Age: 74
End: 2024-11-05
Payer: MEDICARE

## 2024-11-05 NOTE — OUTREACH NOTE
Medical Week 2 Survey      Flowsheet Row Responses   [facility] patient discharged from? [place]   Does the patient have one of the following disease processes/diagnoses(primary or secondary)? Other   Week 2 attempt successful? Yes   Call start time 1500   Discharge diagnosis Benign paroxysmal positional vertigo   Call end time 1501   Is patient permission given to speak with other caregiver? Yes   List who call center can speak with Cecile friend   Person spoke with today (if not patient) and relationship Cecile friend   What is the patient's perception of their health status since discharge? Improving  [Sang at [employer] on Sunday/Has returned to work per friend]   If the patient is a current smoker, are they able to teach back resources for cessation? Not a smoker   Week 2 Call Completed? Yes   Graduated Yes   Graduated/Revoked comments Friend states pt sang in the [employer] choir on Sunday and is back to work today (11/5/24)   Call end time 1501            Alida BA - Registered Nurse

## 2024-11-06 LAB
QT INTERVAL: 434 MS
QTC INTERVAL: 429 MS

## 2024-12-02 ENCOUNTER — LAB (OUTPATIENT)
Dept: LAB | Facility: HOSPITAL | Age: 74
End: 2024-12-02
Payer: MEDICARE

## 2024-12-02 ENCOUNTER — OFFICE VISIT (OUTPATIENT)
Dept: INTERNAL MEDICINE | Facility: CLINIC | Age: 74
End: 2024-12-02
Payer: MEDICARE

## 2024-12-02 ENCOUNTER — APPOINTMENT (OUTPATIENT)
Dept: LAB | Facility: HOSPITAL | Age: 74
End: 2024-12-02
Payer: MEDICARE

## 2024-12-02 VITALS
BODY MASS INDEX: 30.25 KG/M2 | TEMPERATURE: 97.4 F | OXYGEN SATURATION: 95 % | WEIGHT: 160.2 LBS | DIASTOLIC BLOOD PRESSURE: 80 MMHG | HEIGHT: 61 IN | SYSTOLIC BLOOD PRESSURE: 126 MMHG | HEART RATE: 60 BPM

## 2024-12-02 DIAGNOSIS — Z01.42 PAP SMEAR TO CONFIRM NORMAL AFTER ABNORMAL RESULT: ICD-10-CM

## 2024-12-02 DIAGNOSIS — Z83.3 FAMILY HISTORY OF DIABETES MELLITUS: ICD-10-CM

## 2024-12-02 DIAGNOSIS — H81.10 BPPV (BENIGN PAROXYSMAL POSITIONAL VERTIGO), UNSPECIFIED LATERALITY: ICD-10-CM

## 2024-12-02 DIAGNOSIS — E55.9 VITAMIN D INSUFFICIENCY: ICD-10-CM

## 2024-12-02 DIAGNOSIS — E03.8 OTHER SPECIFIED HYPOTHYROIDISM: ICD-10-CM

## 2024-12-02 DIAGNOSIS — Z00.00 MEDICARE ANNUAL WELLNESS VISIT, SUBSEQUENT: Primary | ICD-10-CM

## 2024-12-02 LAB
ALBUMIN SERPL-MCNC: 4.3 G/DL (ref 3.5–5.2)
ALBUMIN/GLOB SERPL: 1.4 G/DL
ALP SERPL-CCNC: 67 U/L (ref 39–117)
ALT SERPL W P-5'-P-CCNC: 23 U/L (ref 1–33)
ANION GAP SERPL CALCULATED.3IONS-SCNC: 14.1 MMOL/L (ref 5–15)
AST SERPL-CCNC: 25 U/L (ref 1–32)
BILIRUB SERPL-MCNC: 0.3 MG/DL (ref 0–1.2)
BUN SERPL-MCNC: 14 MG/DL (ref 8–23)
BUN/CREAT SERPL: 15.1 (ref 7–25)
CALCIUM SPEC-SCNC: 10.2 MG/DL (ref 8.6–10.5)
CHLORIDE SERPL-SCNC: 102 MMOL/L (ref 98–107)
CHOLEST SERPL-MCNC: 215 MG/DL (ref 0–200)
CO2 SERPL-SCNC: 25.9 MMOL/L (ref 22–29)
CREAT SERPL-MCNC: 0.93 MG/DL (ref 0.57–1)
DEPRECATED RDW RBC AUTO: 40.6 FL (ref 37–54)
EGFRCR SERPLBLD CKD-EPI 2021: 65 ML/MIN/1.73
ERYTHROCYTE [DISTWIDTH] IN BLOOD BY AUTOMATED COUNT: 12.7 % (ref 12.3–15.4)
GLOBULIN UR ELPH-MCNC: 3 GM/DL
GLUCOSE SERPL-MCNC: 87 MG/DL (ref 65–99)
HBA1C MFR BLD: 5.6 % (ref 4.8–5.6)
HCT VFR BLD AUTO: 43.9 % (ref 34–46.6)
HDLC SERPL-MCNC: 63 MG/DL (ref 40–60)
HGB BLD-MCNC: 14.9 G/DL (ref 12–15.9)
LDLC SERPL CALC-MCNC: 123 MG/DL (ref 0–100)
LDLC/HDLC SERPL: 1.89 {RATIO}
MCH RBC QN AUTO: 29.9 PG (ref 26.6–33)
MCHC RBC AUTO-ENTMCNC: 33.9 G/DL (ref 31.5–35.7)
MCV RBC AUTO: 88.2 FL (ref 79–97)
PLATELET # BLD AUTO: 189 10*3/MM3 (ref 140–450)
PMV BLD AUTO: 11.9 FL (ref 6–12)
POTASSIUM SERPL-SCNC: 4.6 MMOL/L (ref 3.5–5.2)
PROT SERPL-MCNC: 7.3 G/DL (ref 6–8.5)
RBC # BLD AUTO: 4.98 10*6/MM3 (ref 3.77–5.28)
SODIUM SERPL-SCNC: 142 MMOL/L (ref 136–145)
TRIGL SERPL-MCNC: 165 MG/DL (ref 0–150)
TSH SERPL DL<=0.05 MIU/L-ACNC: 3.8 UIU/ML (ref 0.27–4.2)
VLDLC SERPL-MCNC: 29 MG/DL (ref 5–40)
WBC NRBC COR # BLD AUTO: 6.61 10*3/MM3 (ref 3.4–10.8)

## 2024-12-02 PROCEDURE — 82306 VITAMIN D 25 HYDROXY: CPT

## 2024-12-02 PROCEDURE — 80053 COMPREHEN METABOLIC PANEL: CPT

## 2024-12-02 PROCEDURE — 1159F MED LIST DOCD IN RCRD: CPT | Performed by: INTERNAL MEDICINE

## 2024-12-02 PROCEDURE — 3015F CERV CANCER SCREEN DOCD: CPT | Performed by: INTERNAL MEDICINE

## 2024-12-02 PROCEDURE — 84443 ASSAY THYROID STIM HORMONE: CPT

## 2024-12-02 PROCEDURE — 83036 HEMOGLOBIN GLYCOSYLATED A1C: CPT

## 2024-12-02 PROCEDURE — 85027 COMPLETE CBC AUTOMATED: CPT

## 2024-12-02 PROCEDURE — 82607 VITAMIN B-12: CPT

## 2024-12-02 PROCEDURE — 1160F RVW MEDS BY RX/DR IN RCRD: CPT | Performed by: INTERNAL MEDICINE

## 2024-12-02 PROCEDURE — 99214 OFFICE O/P EST MOD 30 MIN: CPT | Performed by: INTERNAL MEDICINE

## 2024-12-02 PROCEDURE — 1170F FXNL STATUS ASSESSED: CPT | Performed by: INTERNAL MEDICINE

## 2024-12-02 PROCEDURE — 1126F AMNT PAIN NOTED NONE PRSNT: CPT | Performed by: INTERNAL MEDICINE

## 2024-12-02 PROCEDURE — 80061 LIPID PANEL: CPT

## 2024-12-02 PROCEDURE — G0439 PPPS, SUBSEQ VISIT: HCPCS | Performed by: INTERNAL MEDICINE

## 2024-12-02 NOTE — PROGRESS NOTES
Subjective   The ABCs of the Annual Wellness Visit  Medicare Wellness Visit      Kathleen Goetz is a 74 y.o. patient who presents for a Medicare Wellness Visit.    The following portions of the patient's history were reviewed and   updated as appropriate: allergies, current medications, past family history, past medical history, past social history, past surgical history, and problem list.    Compared to one year ago, the patient's physical   health is the same.  Compared to one year ago, the patient's mental   health is the same.    Recent Hospitalizations:  This patient has had a [facility] [place] admission record on file within the last 365 days.  Current Medical Providers:  Patient Care Team:  Rachele Paulino MD as PCP - General  Rachele Paulino MD as PCP - Family Medicine  Flora Cullen MD as Consulting Physician (Dermatopathology)    Outpatient Medications Prior to Visit   Medication Sig Dispense Refill    azelastine (ASTELIN) 0.1 % nasal spray Administer 2 sprays into the nostril(s) as directed by provider 2 (Two) Times a Day. Use in each nostril as directed 30 mL 2    Calcium Carbonate 1500 (600 Ca) MG tablet Take 1 tablet by mouth 2 (Two) Times a Day With Meals.      cetirizine (zyrTEC) 10 MG tablet Take one po daily      estradiol (ESTRACE VAGINAL) 0.1 MG/GM vaginal cream Insert 2 g into the vagina 1 (One) Time Per Week. Insert 1 gram intravaginally at HS 1-2 times a week for atrophy 42 g 1    Misc Natural Products (OSTEO BI-FLEX TRIPLE STRENGTH) tablet Take 1 tablet by mouth 2 (two) times a day.      Multiple Vitamin (DAILY VITAMIN) tablet Take 1 tablet by mouth Daily.      TURMERIC PO Take 1 capsule by mouth Daily.      meclizine (ANTIVERT) 12.5 MG tablet Take 1 tablet by mouth 3 (Three) Times a Day As Needed for Dizziness for up to 30 doses. 30 tablet 0     No facility-administered medications prior to visit.     No opioid medication identified on active medication list. I have reviewed  "chart for other potential  high risk medication/s and harmful drug interactions in the elderly.      Aspirin is not on active medication list.  Aspirin use is not indicated based on review of current medical condition/s. Risk of harm outweighs potential benefits.  .    Patient Active Problem List   Diagnosis    Arthritis    Thumb pain    Annual physical exam    Family history of breast cancer    Family history of cancer    BPPV (benign paroxysmal positional vertigo), unspecified laterality    Elevated glucose     Advance Care Planning Advance Directive is on file.  ACP discussion was held with the patient during this visit. Patient has an advance directive in EMR which is still valid.             Objective   Vitals:    12/02/24 0901   BP: 126/80   Pulse: 60   Temp: 97.4 °F (36.3 °C)   SpO2: 95%  Comment: ra   Weight: 72.7 kg (160 lb 3.2 oz)   Height: 154.9 cm (60.98\")   PainSc: 0-No pain       Estimated body mass index is 30.29 kg/m² as calculated from the following:    Height as of this encounter: 154.9 cm (60.98\").    Weight as of this encounter: 72.7 kg (160 lb 3.2 oz).            Does the patient have evidence of cognitive impairment? No  Lab Results   Component Value Date    TRIG 165 (H) 12/02/2024    HDL 63 (H) 12/02/2024     (H) 12/02/2024    VLDL 29 12/02/2024    HGBA1C 5.20 10/21/2024                                                                                                Health  Risk Assessment    Smoking Status:  Social History     Tobacco Use   Smoking Status Never   Smokeless Tobacco Never   Tobacco Comments    My  smoked a pipe during the first 4 months of our marriage in 1977     Alcohol Consumption:  Social History     Substance and Sexual Activity   Alcohol Use Not Currently    Comment: 1 glass/wine Aug 2022, Sept 2023,24; formerly 6-9 glasses/yr       Fall Risk Screen  STEADI Fall Risk Assessment was completed, and patient is at HIGH risk for falls. Assessment completed " on:2024    Depression Screening   Little interest or pleasure in doing things? Not at all   Feeling down, depressed, or hopeless? Not at all   PHQ-2 Total Score 0      Health Habits and Functional and Cognitive Screenin/2/2024     9:06 AM   Functional & Cognitive Status   Do you have difficulty preparing food and eating? No   Do you have difficulty bathing yourself, getting dressed or grooming yourself? No   Do you have difficulty using the toilet? No   Do you have difficulty moving around from place to place? No   Do you have trouble with steps or getting out of a bed or a chair? No   Current Diet Well Balanced Diet   Dental Exam Up to date   Eye Exam Up to date   Exercise (times per week) 7 times per week   Current Exercises Include Walking   Do you need help using the phone?  No   Are you deaf or do you have serious difficulty hearing?  No   Do you need help to go to places out of walking distance? No   Do you need help shopping? No   Do you need help preparing meals?  No   Do you need help with housework?  No   Do you need help with laundry? No   Do you need help taking your medications? No   Do you need help managing money? No   Do you ever drive or ride in a car without wearing a seat belt? No   Have you felt unusual stress, anger or loneliness in the last month? No   Who do you live with? Other   If you need help, do you have trouble finding someone available to you? No   Have you been bothered in the last four weeks by sexual problems? No   Do you have difficulty concentrating, remembering or making decisions? Yes           Age-appropriate Screening Schedule:  Refer to the list below for future screening recommendations based on patient's age, sex and/or medical conditions. Orders for these recommended tests are listed in the plan section. The patient has been provided with a written plan.    Health Maintenance List  Health Maintenance   Topic Date Due    COLORECTAL CANCER SCREENING   12/12/2024    BMI FOLLOWUP  10/25/2025    ANNUAL WELLNESS VISIT  12/02/2025    LIPID PANEL  12/02/2025    DXA SCAN  02/01/2026    MAMMOGRAM  10/07/2026    PAP SMEAR  11/27/2026    TDAP/TD VACCINES (3 - Td or Tdap) 09/26/2034    HEPATITIS C SCREENING  Completed    COVID-19 Vaccine  Completed    INFLUENZA VACCINE  Completed    Pneumococcal Vaccine 65+  Completed    ZOSTER VACCINE  Completed                                                                                                                                                CMS Preventative Services Quick Reference  Risk Factors Identified During Encounter  Immunizations Discussed/Encouraged:  utd    The above risks/problems have been discussed with the patient.  Pertinent information has been shared with the patient in the After Visit Summary.  An After Visit Summary and PPPS were made available to the patient.    Follow Up:   Next Medicare Wellness visit to be scheduled in 1 year.         Additional E&M Note during same encounter follows:  Patient has additional, significant, and separately identifiable condition(s)/problem(s) that require work above and beyond the Medicare Wellness Visit     Chief Complaint  Medicare Wellness-subsequent    Subjective   HPI  Kathleen is also being seen today for additional medical problem/s.  Kathleen reports that she got engaged over Thanksgiving, Fiance will relocate from [place].   Using the estrace cream. Doing better.  Colonoscopy scheduled in 2 weeks- Dr. Patel.    Review of Systems   Constitutional:  Negative for chills and fever.   HENT:  Negative for congestion, ear pain and sore throat.    Eyes:  Negative for pain, redness and visual disturbance.   Respiratory:  Negative for cough and shortness of breath.    Cardiovascular:  Negative for chest pain, palpitations and leg swelling.   Gastrointestinal:  Negative for abdominal pain, diarrhea and nausea.   Endocrine: Negative for cold intolerance and heat intolerance.  "  Genitourinary:  Negative for flank pain and urgency.   Musculoskeletal:  Negative for arthralgias and gait problem.   Skin:  Negative for pallor and rash.   Neurological:  Negative for dizziness and weakness.   Psychiatric/Behavioral:  Negative for dysphoric mood and sleep disturbance. The patient is not nervous/anxious.               Objective   Vital Signs:  /80   Pulse 60   Temp 97.4 °F (36.3 °C)   Ht 154.9 cm (60.98\")   Wt 72.7 kg (160 lb 3.2 oz)   SpO2 95% Comment: ra  BMI 30.29 kg/m²   Physical Exam  Constitutional:       General: She is not in acute distress.     Appearance: Normal appearance.   HENT:      Head: Normocephalic and atraumatic.      Right Ear: Tympanic membrane and external ear normal.      Left Ear: Tympanic membrane and external ear normal.      Nose: Nose normal.      Mouth/Throat:      Mouth: Mucous membranes are moist.   Eyes:      General: No scleral icterus.  Neck:      Vascular: No carotid bruit.   Cardiovascular:      Rate and Rhythm: Normal rate and regular rhythm.      Pulses: Normal pulses.      Heart sounds: Normal heart sounds. No murmur heard.     No friction rub. No gallop.   Pulmonary:      Effort: Pulmonary effort is normal.      Breath sounds: Normal breath sounds. No rhonchi or rales.   Abdominal:      General: Bowel sounds are normal. There is no distension.      Palpations: Abdomen is soft.      Tenderness: There is no right CVA tenderness, left CVA tenderness, guarding or rebound.      Hernia: No hernia is present. There is no hernia in the left inguinal area or right inguinal area.   Genitourinary:     General: Normal vulva.      Exam position: Lithotomy position.      Pubic Area: No rash.       Labia:         Right: No lesion.         Left: No lesion.       Urethra: No prolapse, urethral pain or urethral swelling.      Vagina: No vaginal discharge or tenderness.      Cervix: No cervical motion tenderness, discharge or cervical bleeding.      Uterus: Normal. " Not enlarged and not tender.       Adnexa: Right adnexa normal and left adnexa normal.      Rectum: No tenderness, external hemorrhoid or internal hemorrhoid.   Musculoskeletal:         General: No tenderness. Normal range of motion.      Cervical back: Normal range of motion.      Right lower leg: No edema.      Left lower leg: No edema.   Lymphadenopathy:      Cervical: No cervical adenopathy.      Lower Body: No right inguinal adenopathy. No left inguinal adenopathy.   Skin:     General: Skin is warm.      Findings: No rash.   Neurological:      General: No focal deficit present.      Mental Status: She is alert and oriented to person, place, and time. Mental status is at baseline.      Cranial Nerves: No cranial nerve deficit.      Sensory: No sensory deficit.      Coordination: Coordination normal.      Gait: Gait normal.      Deep Tendon Reflexes: Reflexes normal.      Comments: Balance wnl   Psychiatric:         Mood and Affect: Mood normal.         Behavior: Behavior normal.         The following data was reviewed by: Rachele Paulino MD on 12/02/2024:              Assessment and Plan         Current Outpatient Medications:     azelastine (ASTELIN) 0.1 % nasal spray, Administer 2 sprays into the nostril(s) as directed by provider 2 (Two) Times a Day. Use in each nostril as directed, Disp: 30 mL, Rfl: 2    Calcium Carbonate 1500 (600 Ca) MG tablet, Take 1 tablet by mouth 2 (Two) Times a Day With Meals., Disp: , Rfl:     cetirizine (zyrTEC) 10 MG tablet, Take one po daily, Disp: , Rfl:     estradiol (ESTRACE VAGINAL) 0.1 MG/GM vaginal cream, Insert 2 g into the vagina 1 (One) Time Per Week. Insert 1 gram intravaginally at HS 1-2 times a week for atrophy, Disp: 42 g, Rfl: 1    Misc Natural Products (OSTEO BI-FLEX TRIPLE STRENGTH) tablet, Take 1 tablet by mouth 2 (two) times a day., Disp: , Rfl:     Multiple Vitamin (DAILY VITAMIN) tablet, Take 1 tablet by mouth Daily., Disp: , Rfl:     TURMERIC PO, Take 1  "capsule by mouth Daily., Disp: , Rfl:       The following portions of the patient's history were reviewed and updated as appropriate: allergies, current medications, past family history, past medical history, past social history, past surgical history and problem list.       Objective   /80   Pulse 60   Temp 97.4 °F (36.3 °C)   Ht 154.9 cm (60.98\")   Wt 72.7 kg (160 lb 3.2 oz)   SpO2 95% Comment: ra  BMI 30.29 kg/m²       Assessment & Plan   Diagnoses and all orders for this visit:    Medicare annual wellness visit, subsequent    Vitamin D insufficiency  -     Vitamin B12; Future  -     Vitamin D,25-Hydroxy; Future    Other specified hypothyroidism  -     Comprehensive Metabolic Panel; Future  -     Lipid Panel; Future  -     TSH Rfx On Abnormal To Free T4; Future    Family history of diabetes mellitus  -     Hemoglobin A1c; Future    Pap smear to confirm normal after abnormal result  -     LIQUID-BASED PAP SMEAR WITH HPV GENOTYPING REGARDLESS OF INTERPRETATION (JORGE,COR,MAD); Future  -     LIQUID-BASED PAP SMEAR WITH HPV GENOTYPING REGARDLESS OF INTERPRETATION (JORGE,COR,MAD)    BPPV (benign paroxysmal positional vertigo), unspecified laterality  -     CBC (No Diff); Future      Last pap- ASCUS with -ve HPV. Rpt this year and if +ve, will refer to Gyn.    Engaged. Adv to d/w .         PHQ-2/PHQ-9 Depression screening reviewed with patient . Total time spent today for depression screening  with Kathleen Goetz  was 15 minutes in counseling, along with plans for any diagnositc work-up and treatment.    Advice and education were given regarding cardiovascular risk reduction, healthy dietary habits, Seatbelt and helmet use and self skin examination.               Medicare annual wellness visit, subsequent         Vitamin D insufficiency    Orders:    Vitamin B12; Future    Vitamin D,25-Hydroxy; Future    Other specified hypothyroidism    Orders:    Comprehensive Metabolic Panel; " Future    Lipid Panel; Future    TSH Rfx On Abnormal To Free T4; Future    Family history of diabetes mellitus    Orders:    Hemoglobin A1c; Future    Pap smear to confirm normal after abnormal result    Orders:    LIQUID-BASED PAP SMEAR WITH HPV GENOTYPING REGARDLESS OF INTERPRETATION (JORGE,COR,MAD); Future    LIQUID-BASED PAP SMEAR WITH HPV GENOTYPING REGARDLESS OF INTERPRETATION (JORGE,COR,MAD)    BPPV (benign paroxysmal positional vertigo), unspecified laterality    Orders:    CBC (No Diff); Future            Follow Up   Return in about 1 year (around 12/2/2025) for Medicare Wellness.  Patient was given instructions and counseling regarding her condition or for health maintenance advice. Please see specific information pulled into the AVS if appropriate.      Electronically signed by:    Rachele Paulino MD

## 2024-12-03 LAB
25(OH)D3 SERPL-MCNC: 57 NG/ML (ref 30–100)
VIT B12 BLD-MCNC: 1665 PG/ML (ref 211–946)

## 2024-12-05 LAB — REF LAB TEST METHOD: NORMAL

## 2025-01-09 ENCOUNTER — PATIENT ROUNDING (BHMG ONLY) (OUTPATIENT)
Dept: URGENT CARE | Facility: CLINIC | Age: 75
End: 2025-01-09
Payer: MEDICARE

## 2025-01-28 DIAGNOSIS — H81.10 BENIGN PAROXYSMAL POSITIONAL VERTIGO, UNSPECIFIED LATERALITY: ICD-10-CM

## 2025-01-28 RX ORDER — AZELASTINE HYDROCHLORIDE 137 UG/1
SPRAY, METERED NASAL
Qty: 90 ML | Refills: 3 | Status: SHIPPED | OUTPATIENT
Start: 2025-01-28

## 2025-08-22 ENCOUNTER — TRANSCRIBE ORDERS (OUTPATIENT)
Dept: INTERNAL MEDICINE | Facility: CLINIC | Age: 75
End: 2025-08-22
Payer: MEDICARE

## 2025-08-22 DIAGNOSIS — Z12.31 VISIT FOR SCREENING MAMMOGRAM: Primary | ICD-10-CM
